# Patient Record
Sex: MALE | Race: WHITE | NOT HISPANIC OR LATINO
[De-identification: names, ages, dates, MRNs, and addresses within clinical notes are randomized per-mention and may not be internally consistent; named-entity substitution may affect disease eponyms.]

---

## 2018-04-16 PROBLEM — Z00.00 ENCOUNTER FOR PREVENTIVE HEALTH EXAMINATION: Status: ACTIVE | Noted: 2018-04-16

## 2018-05-24 ENCOUNTER — APPOINTMENT (OUTPATIENT)
Dept: ELECTROPHYSIOLOGY | Facility: CLINIC | Age: 63
End: 2018-05-24
Payer: MEDICARE

## 2018-05-24 VITALS
SYSTOLIC BLOOD PRESSURE: 144 MMHG | HEART RATE: 63 BPM | WEIGHT: 149 LBS | DIASTOLIC BLOOD PRESSURE: 64 MMHG | BODY MASS INDEX: 23.39 KG/M2 | HEIGHT: 67 IN | OXYGEN SATURATION: 100 %

## 2018-05-24 DIAGNOSIS — F79 UNSPECIFIED INTELLECTUAL DISABILITIES: ICD-10-CM

## 2018-05-24 DIAGNOSIS — Z86.39 PERSONAL HISTORY OF OTHER ENDOCRINE, NUTRITIONAL AND METABOLIC DISEASE: ICD-10-CM

## 2018-05-24 DIAGNOSIS — I42.9 CARDIOMYOPATHY, UNSPECIFIED: ICD-10-CM

## 2018-05-24 DIAGNOSIS — R56.9 UNSPECIFIED CONVULSIONS: ICD-10-CM

## 2018-05-24 PROCEDURE — 93000 ELECTROCARDIOGRAM COMPLETE: CPT

## 2018-05-24 PROCEDURE — 99205 OFFICE O/P NEW HI 60 MIN: CPT

## 2018-05-24 RX ORDER — OLOPATADINE HCL 1 MG/ML
0.1 SOLUTION/ DROPS OPHTHALMIC
Qty: 5 | Refills: 0 | Status: DISCONTINUED | COMMUNITY
Start: 2018-02-22

## 2018-05-24 RX ORDER — OLOPATADINE HYDROCHLORIDE 2 MG/ML
0.2 SOLUTION OPHTHALMIC
Qty: 2 | Refills: 0 | Status: DISCONTINUED | COMMUNITY
Start: 2018-01-31

## 2018-05-24 RX ORDER — PHENYTOIN 125 MG/5ML
125 SUSPENSION ORAL 3 TIMES DAILY
Refills: 0 | Status: ACTIVE | COMMUNITY
Start: 2017-12-06

## 2018-05-24 RX ORDER — MELOXICAM 15 MG/1
15 TABLET ORAL
Refills: 0 | Status: ACTIVE | COMMUNITY
Start: 2017-12-24

## 2018-06-06 ENCOUNTER — OUTPATIENT (OUTPATIENT)
Dept: OUTPATIENT SERVICES | Facility: HOSPITAL | Age: 63
LOS: 1 days | End: 2018-06-06
Payer: MEDICARE

## 2018-06-06 VITALS
HEIGHT: 67 IN | TEMPERATURE: 98 F | DIASTOLIC BLOOD PRESSURE: 60 MMHG | HEART RATE: 65 BPM | RESPIRATION RATE: 18 BRPM | SYSTOLIC BLOOD PRESSURE: 123 MMHG | OXYGEN SATURATION: 99 % | WEIGHT: 149.03 LBS

## 2018-06-06 VITALS
DIASTOLIC BLOOD PRESSURE: 60 MMHG | TEMPERATURE: 98 F | WEIGHT: 149.03 LBS | SYSTOLIC BLOOD PRESSURE: 123 MMHG | HEIGHT: 67 IN | RESPIRATION RATE: 18 BRPM | OXYGEN SATURATION: 99 % | HEART RATE: 64 BPM

## 2018-06-06 DIAGNOSIS — Z90.89 ACQUIRED ABSENCE OF OTHER ORGANS: Chronic | ICD-10-CM

## 2018-06-06 DIAGNOSIS — Z01.810 ENCOUNTER FOR PREPROCEDURAL CARDIOVASCULAR EXAMINATION: ICD-10-CM

## 2018-06-06 DIAGNOSIS — Z98.890 OTHER SPECIFIED POSTPROCEDURAL STATES: Chronic | ICD-10-CM

## 2018-06-06 DIAGNOSIS — H26.9 UNSPECIFIED CATARACT: Chronic | ICD-10-CM

## 2018-06-06 LAB
ANION GAP SERPL CALC-SCNC: 11 MMOL/L — SIGNIFICANT CHANGE UP (ref 5–17)
APTT BLD: 33.9 SEC — SIGNIFICANT CHANGE UP (ref 27.5–37.4)
BLD GP AB SCN SERPL QL: SIGNIFICANT CHANGE UP
BLD GP AB SCN SERPL QL: SIGNIFICANT CHANGE UP
BUN SERPL-MCNC: 14 MG/DL — SIGNIFICANT CHANGE UP (ref 8–20)
CALCIUM SERPL-MCNC: 8.9 MG/DL — SIGNIFICANT CHANGE UP (ref 8.6–10.2)
CHLORIDE SERPL-SCNC: 99 MMOL/L — SIGNIFICANT CHANGE UP (ref 98–107)
CO2 SERPL-SCNC: 28 MMOL/L — SIGNIFICANT CHANGE UP (ref 22–29)
CREAT SERPL-MCNC: 0.62 MG/DL — SIGNIFICANT CHANGE UP (ref 0.5–1.3)
GLUCOSE SERPL-MCNC: 86 MG/DL — SIGNIFICANT CHANGE UP (ref 70–115)
HCT VFR BLD CALC: 38.7 % — LOW (ref 42–52)
HGB BLD-MCNC: 12.7 G/DL — LOW (ref 14–18)
INR BLD: 1.04 RATIO — SIGNIFICANT CHANGE UP (ref 0.88–1.16)
MAGNESIUM SERPL-MCNC: 2.1 MG/DL — SIGNIFICANT CHANGE UP (ref 1.6–2.6)
MCHC RBC-ENTMCNC: 30.8 PG — SIGNIFICANT CHANGE UP (ref 27–31)
MCHC RBC-ENTMCNC: 32.8 G/DL — SIGNIFICANT CHANGE UP (ref 32–36)
MCV RBC AUTO: 93.9 FL — SIGNIFICANT CHANGE UP (ref 80–94)
PLATELET # BLD AUTO: 159 K/UL — SIGNIFICANT CHANGE UP (ref 150–400)
POTASSIUM SERPL-MCNC: 4.4 MMOL/L — SIGNIFICANT CHANGE UP (ref 3.5–5.3)
POTASSIUM SERPL-SCNC: 4.4 MMOL/L — SIGNIFICANT CHANGE UP (ref 3.5–5.3)
PROTHROM AB SERPL-ACNC: 11.4 SEC — SIGNIFICANT CHANGE UP (ref 9.8–12.7)
RBC # BLD: 4.12 M/UL — LOW (ref 4.6–6.2)
RBC # FLD: 14.6 % — SIGNIFICANT CHANGE UP (ref 11–15.6)
SODIUM SERPL-SCNC: 138 MMOL/L — SIGNIFICANT CHANGE UP (ref 135–145)
WBC # BLD: 4 K/UL — LOW (ref 4.8–10.8)
WBC # FLD AUTO: 4 K/UL — LOW (ref 4.8–10.8)

## 2018-06-06 PROCEDURE — 85730 THROMBOPLASTIN TIME PARTIAL: CPT

## 2018-06-06 PROCEDURE — 86850 RBC ANTIBODY SCREEN: CPT

## 2018-06-06 PROCEDURE — 85610 PROTHROMBIN TIME: CPT

## 2018-06-06 PROCEDURE — 83735 ASSAY OF MAGNESIUM: CPT

## 2018-06-06 PROCEDURE — 93005 ELECTROCARDIOGRAM TRACING: CPT

## 2018-06-06 PROCEDURE — 85027 COMPLETE CBC AUTOMATED: CPT

## 2018-06-06 PROCEDURE — 86900 BLOOD TYPING SEROLOGIC ABO: CPT

## 2018-06-06 PROCEDURE — 36415 COLL VENOUS BLD VENIPUNCTURE: CPT

## 2018-06-06 PROCEDURE — 93010 ELECTROCARDIOGRAM REPORT: CPT

## 2018-06-06 PROCEDURE — 86901 BLOOD TYPING SEROLOGIC RH(D): CPT

## 2018-06-06 PROCEDURE — 80048 BASIC METABOLIC PNL TOTAL CA: CPT

## 2018-06-06 PROCEDURE — G0463: CPT

## 2018-06-06 NOTE — H&P PST ADULT - HISTORY OF PRESENT ILLNESS
TTE 11/9/17: EF 53%, inferoseptal WMA, mild-mod MR, mild AI  Stress test 10/22/15: no ischemia 62 year old gentleman with history of mental retardation HTN, bipolar disease, right cervical carotid pseudoaneurysm, stroke and history of atrial futter, cardiomyopathy with recovered LV function. Given his documented history of atrial flutter, there is an appropriate concern for a thromboembolic etiology of his stroke, and therefore he warrants consideration of anticoagulation (CHADSVASc score =3). However, given the report of atrial flutter only documented in the setting of sepsis, there is some question whether he continues to have paroxysmal atrial arrhythmias warranting prophylaixs. If so, however, due to his cognitive disability and frequent falls, he is not a good candidate for long-term anticoagulation. We therefore discussed the potential for percutaneous left atrial appendage occlusion (eg the Watchman procedure) as an alternative to long-term anticoagulation if indicated. The procedure was discussed in detail, including risks and benefits, and procedure related risks of bleeding, cardiac perforation, and stroke. After a shared decision making process with the pt to the best of his ability, his medical assistant, and his cardiologist Dr Frazier, the decision was made to proceed with Watchman procedure if recurrent atrial arrhythmias are noted. A loop recorder implant would be the most effective way to monitor for arrhythmia recurrence, and if so, will then proceed with Watchman procedure.   -Will plan HEIDI for assessment of LAURE anatomy and r/o present thrombus    -Will plan ILR implant for long-term monitoring for arrhythmia recurrence  -following above if atrial flutter or atrial fibrillation documented, will plan Watchman procedure. He will need to start short term anticoagulation (likely safest with NOAC) after the procedure for at least 6 weeks. After this a HEIDI will be done to assess the device, and if well seated will stop anticoagulation. He will then take ASA and Plavix until 6 mo post procedure, and then should go back to ASA alone indefinitely.       -The patient’s mother is his health care proxy and signs consents. We will include her in the shared decision making process as well.  TTE 11/9/17: EF 53%, inferoseptal WMA, mild-mod MR, mild AI  Stress test 10/22/15: no ischemia 62 year old gentleman with history of mental retardation, HTN, bipolar disease, right cervical carotid pseudoaneurysm, stroke and history of atrial futter, PVCs s/p prior ablation and cardiomyopathy with recovered LV function. Given his documented history of atrial flutter, there is an appropriate concern for a thromboembolic etiology of his stroke, and therefore he warrants consideration of anticoagulation (CHADSVASc score =3). However, given the report of atrial flutter only documented in the setting of sepsis, there is some question whether he continues to have paroxysmal atrial arrhythmias warranting prophylaixs. If so, however, due to his cognitive disability and frequent falls, he is not a good candidate for long-term anticoagulation. We therefore discussed the potential for percutaneous left atrial appendage occlusion (eg the Watchman procedure) as an alternative to long-term anticoagulation if indicated. The procedure was discussed in detail, including risks and benefits, and procedure related risks of bleeding, cardiac perforation, and stroke. After a shared decision making process with the pt to the best of his ability, his medical assistant, and his cardiologist Dr Frazier, the decision was made to proceed with Watchman procedure if recurrent atrial arrhythmias are noted. A loop recorder implant would be the most effective way to monitor for arrhythmia recurrence, and if so, will then proceed with Watchman procedure.   -Will plan HEIDI for assessment of LAURE anatomy and r/o present thrombus    -Will plan ILR implant for long-term monitoring for arrhythmia recurrence  -following above if atrial flutter or atrial fibrillation documented, will plan Watchman procedure. He will need to start short term anticoagulation (likely safest with NOAC) after the procedure for at least 6 weeks. After this a HEIDI will be done to assess the device, and if well seated will stop anticoagulation. He will then take ASA and Plavix until 6 mo post procedure, and then should go back to ASA alone indefinitely.       -The patient’s mother is his health care proxy and signs consents. We will include her in the shared decision making process as well.  TTE 11/9/17: EF 53%, inferoseptal WMA, mild-mod MR, mild AI  Stress test 10/22/15: no ischemia 62 year old gentleman with history of mental retardation, HTN, bipolar disease, seizure d/o (last seizure >10 years ago, right cervical carotid pseudoaneurysm, stroke and history of atrial futter, PVCs s/p prior ablation and cardiomyopathy with recovered LV function. Given his documented history of atrial flutter, there is an appropriate concern for a thromboembolic etiology of his stroke, and therefore he warrants consideration of anticoagulation (CHADSVASc score =3). However, given the report of atrial flutter only documented in the setting of sepsis, there is some question whether he continues to have paroxysmal atrial arrhythmias warranting prophylaixs. If so, however, due to his cognitive disability and frequent falls, he is not a good candidate for long-term anticoagulation. We therefore discussed the potential for percutaneous left atrial appendage occlusion (eg the Watchman procedure) as an alternative to long-term anticoagulation if indicated. The procedure was discussed in detail, including risks and benefits, and procedure related risks of bleeding, cardiac perforation, and stroke. After a shared decision making process with the pt to the best of his ability, his medical assistant, and his cardiologist Dr Frazier, the decision was made to proceed with Watchman procedure if recurrent atrial arrhythmias are noted. A loop recorder implant would be the most effective way to monitor for arrhythmia recurrence, and if so, will then proceed with Watchman procedure.   -Will plan HEIDI for assessment of LAURE anatomy and r/o present thrombus    -Will plan ILR implant for long-term monitoring for arrhythmia recurrence  -following above if atrial flutter or atrial fibrillation documented, will plan Watchman procedure. He will need to start short term anticoagulation (likely safest with NOAC) after the procedure for at least 6 weeks. After this a HEIDI will be done to assess the device, and if well seated will stop anticoagulation. He will then take ASA and Plavix until 6 mo post procedure, and then should go back to ASA alone indefinitely.       -The patient’s mother is his health care proxy and signs consents. We will include her in the shared decision making process as well.  TTE 11/9/17: EF 53%, inferoseptal WMA, mild-mod MR, mild AI  Stress test 10/22/15: no ischemia

## 2018-06-06 NOTE — ASU PATIENT PROFILE, ADULT - PMH
Atrial flutter  in setting of sepsis  Bipolar disorder    Cardiomyopathy  recovered LV function  CVA (cerebral vascular accident)    HTN (hypertension)    Hypothyroid    Mental retardation    Pseudoaneurysm  right cerviacl carotid  PVC (premature ventricular contraction)  s/p pvc ablation by Dr Barrientos 2/2016  Seizure disorder

## 2018-06-06 NOTE — ASU PATIENT PROFILE, ADULT - PSH
Bilateral cataracts  s/p removal  H/O cardiac radiofrequency ablation  PVC-Dr Barrientos 2/2016  H/O tracheostomy  successful removal  S/P tonsillectomy

## 2018-06-06 NOTE — H&P PST ADULT - PMH
Atrial flutter    Bipolar disorder    Cardiomyopathy  recovered LV function  CVA (cerebral vascular accident)    HTN (hypertension)    Mental retardation    Pseudoaneurysm  right cerviacl carotid  PVC (premature ventricular contraction)  s/p pvc ablation by Dr Barrientos 2/2016 Atrial flutter  in setting of sepsis  Bipolar disorder    Cardiomyopathy  recovered LV function  CVA (cerebral vascular accident)    HTN (hypertension)    Hypothyroid    Mental retardation    Pseudoaneurysm  right cerviacl carotid  PVC (premature ventricular contraction)  s/p pvc ablation by Dr Barrientos 2/2016 Atrial flutter  in setting of sepsis  Bipolar disorder    Cardiomyopathy  recovered LV function  CVA (cerebral vascular accident)    HTN (hypertension)    Hypothyroid    Mental retardation    Pseudoaneurysm  right cerviacl carotid  PVC (premature ventricular contraction)  s/p pvc ablation by Dr Barrientos 2/2016  Seizure disorder

## 2018-06-06 NOTE — H&P PST ADULT - NSANTHOSAYNRD_GEN_A_CORE
No. KERRI screening performed.  STOP BANG Legend: 0-2 = LOW Risk; 3-4 = INTERMEDIATE Risk; 5-8 = HIGH Risk

## 2018-06-06 NOTE — H&P PST ADULT - TEMPERATURE IN FAHRENHEIT (DEGREES F)
- has fever, leukocytosis, recent hospital stay  - fever could be 2/2 lymphoma however will cover with broad spectrum abx for now.  - f/u blood cultures, urine cultures (NGTD)  - c/w broad spectrum Abx (vancomycin/Zosyn) 97.8 - has fever, leukocytosis, recent hospital stay  - fever could be 2/2 lymphoma however will cover with broad spectrum abx for now.  - f/u blood cultures, urine cultures (NGTD)  - c/w broad spectrum Abx (vancomycin/Cefepime)

## 2018-06-06 NOTE — H&P PST ADULT - PSH
H/O cardiac radiofrequency ablation  PVC-Dr Barrientos 2/2016 Bilateral cataracts  s/p removal  H/O cardiac radiofrequency ablation  PVC-Dr Barrientos 2/2016  H/O tracheostomy    S/P tonsillectomy Bilateral cataracts  s/p removal  H/O cardiac radiofrequency ablation  PVC-Dr Barrientos 2/2016  H/O tracheostomy  successful removal  S/P tonsillectomy

## 2018-06-06 NOTE — H&P PST ADULT - SOURCE OF INFORMATION, PROFILE
patient/chart(s) patient/Mother is HCP and signs pts consents/family/chart(s) Ngoc Quiros- Medical case worker. Mother is HCP and signs pts consents./patient/family/chart(s)

## 2018-06-06 NOTE — ASU PATIENT PROFILE, ADULT - FALL HARM RISK
seizure  meds,  and disorder,  grp home  has  adaptive equipment,  staff manages  safety needs of  client/other

## 2018-06-06 NOTE — H&P PST ADULT - ASSESSMENT
62 year old gentleman with history of mental retardation, HTN, bipolar disease, right cervical carotid pseudoaneurysm, stroke and history of atrial futter, PVCs s/p prior ablation and cardiomyopathy with recovered LV function. Given his documented history of atrial flutter, there is an appropriate concern for a thromboembolic etiology of his stroke, and therefore he warrants consideration of anticoagulation (CHADSVASc score =3). However, given the report of atrial flutter only documented in the setting of sepsis, there is some question whether he continues to have paroxysmal atrial arrhythmias warranting prophylaixs. If so, however, due to his cognitive disability and frequent falls, he is not a good candidate for long-term anticoagulation. We therefore discussed the potential for percutaneous left atrial appendage occlusion (eg the Watchman procedure) as an alternative to long-term anticoagulation if indicated. The procedure was discussed in detail, including risks and benefits, and procedure related risks of bleeding, cardiac perforation, and stroke. After a shared decision making process with the pt to the best of his ability, his medical assistant, and his cardiologist Dr Frazier, the decision was made to proceed with Watchman procedure if recurrent atrial arrhythmias are noted. A loop recorder implant would be the most effective way to monitor for arrhythmia recurrence, and if so, will then proceed with Watchman procedure.   -Will plan HEIDI for assessment of LAURE anatomy and r/o present thrombus    -Will plan ILR implant for long-term monitoring for arrhythmia recurrence  -following above if atrial flutter or atrial fibrillation documented, will plan Watchman procedure. He will need to start short term anticoagulation (likely safest with NOAC) after the procedure for at least 6 weeks. After this a HEIDI will be done to assess the device, and if well seated will stop anticoagulation. He will then take ASA and Plavix until 6 mo post procedure, and then should go back to ASA alone indefinitely. 62 year old gentleman with history of mental retardation, HTN, bipolar disease, right cervical carotid pseudoaneurysm, stroke and history of atrial futter, PVCs s/p prior ablation and cardiomyopathy with recovered LV function. Given his documented history of atrial flutter, there is an appropriate concern for a thromboembolic etiology of his stroke, and therefore he warrants consideration of anticoagulation (CHADSVASc score =3). However, given the report of atrial flutter only documented in the setting of sepsis, there is some question whether he continues to have paroxysmal atrial arrhythmias warranting prophylaixs. If so, however, due to his cognitive disability and frequent falls, he is not a good candidate for long-term anticoagulation. We therefore discussed the potential for percutaneous left atrial appendage occlusion (eg the Watchman procedure) as an alternative to long-term anticoagulation if indicated. The procedure was discussed in detail, including risks and benefits, and procedure related risks of bleeding, cardiac perforation, and stroke. After a shared decision making process with the pt to the best of his ability, his medical assistant, and his cardiologist Dr Frazier, the decision was made to proceed with Watchman procedure if recurrent atrial arrhythmias are noted. A loop recorder implant would be the most effective way to monitor for arrhythmia recurrence, and if so, will then proceed with Watchman procedure.   -Will plan HEIDI for assessment of LAURE anatomy and r/o present thrombus    -Will plan ILR implant for long-term monitoring for arrhythmia recurrence  -following above if atrial flutter or atrial fibrillation documented, will plan Watchman procedure. He will need to start short term anticoagulation (likely safest with NOAC) after the procedure for at least 6 weeks. After this a HEIDI will be done to assess the device, and if well seated will stop anticoagulation. He will then take ASA and Plavix until 6 mo post procedure, and then should go back to ASA alone indefinitely.    -npo after midnight for procedure 62 year old gentleman with history of mental retardation, HTN, bipolar disease, seizure d/o (last seizure >10 years ago, right cervical carotid pseudoaneurysm, stroke and history of atrial futter, PVCs s/p prior ablation and cardiomyopathy with recovered LV function. Given his documented history of atrial flutter, there is an appropriate concern for a thromboembolic etiology of his stroke, and therefore he warrants consideration of anticoagulation (CHADSVASc score =3). However, given the report of atrial flutter only documented in the setting of sepsis, there is some question whether he continues to have paroxysmal atrial arrhythmias warranting prophylaixs. If so, however, due to his cognitive disability and frequent falls, he is not a good candidate for long-term anticoagulation. We therefore discussed the potential for percutaneous left atrial appendage occlusion (eg the Watchman procedure) as an alternative to long-term anticoagulation if indicated. The procedure was discussed in detail, including risks and benefits, and procedure related risks of bleeding, cardiac perforation, and stroke. After a shared decision making process with the pt to the best of his ability, his medical assistant, and his cardiologist Dr Frazier, the decision was made to proceed with Watchman procedure if recurrent atrial arrhythmias are noted. A loop recorder implant would be the most effective way to monitor for arrhythmia recurrence, and if so, will then proceed with Watchman procedure.   -Will plan HEIDI for assessment of LAURE anatomy and r/o present thrombus    -Will plan ILR implant for long-term monitoring for arrhythmia recurrence  -following above if atrial flutter or atrial fibrillation documented, will plan Watchman procedure. He will need to start short term anticoagulation (likely safest with NOAC) after the procedure for at least 6 weeks. After this a HEIDI will be done to assess the device, and if well seated will stop anticoagulation. He will then take ASA and Plavix until 6 mo post procedure, and then should go back to ASA alone indefinitely.    -npo after midnight for procedure

## 2018-06-07 ENCOUNTER — FORM ENCOUNTER (OUTPATIENT)
Age: 63
End: 2018-06-07

## 2018-06-07 LAB — TYPE + AB SCN PNL BLD: SIGNIFICANT CHANGE UP

## 2018-06-08 ENCOUNTER — TRANSCRIPTION ENCOUNTER (OUTPATIENT)
Age: 63
End: 2018-06-08

## 2018-06-08 ENCOUNTER — OUTPATIENT (OUTPATIENT)
Dept: OUTPATIENT SERVICES | Facility: HOSPITAL | Age: 63
LOS: 1 days | Discharge: HOME | End: 2018-06-08
Payer: MEDICARE

## 2018-06-08 DIAGNOSIS — Z90.89 ACQUIRED ABSENCE OF OTHER ORGANS: Chronic | ICD-10-CM

## 2018-06-08 DIAGNOSIS — Z98.890 OTHER SPECIFIED POSTPROCEDURAL STATES: Chronic | ICD-10-CM

## 2018-06-08 DIAGNOSIS — Z01.810 ENCOUNTER FOR PREPROCEDURAL CARDIOVASCULAR EXAMINATION: ICD-10-CM

## 2018-06-08 DIAGNOSIS — I48.91 UNSPECIFIED ATRIAL FIBRILLATION: ICD-10-CM

## 2018-06-08 DIAGNOSIS — R55 SYNCOPE AND COLLAPSE: ICD-10-CM

## 2018-06-08 DIAGNOSIS — H26.9 UNSPECIFIED CATARACT: Chronic | ICD-10-CM

## 2018-06-08 PROCEDURE — C1764: CPT

## 2018-06-08 PROCEDURE — 33282: CPT

## 2018-06-08 PROCEDURE — 93325 DOPPLER ECHO COLOR FLOW MAPG: CPT | Mod: 26

## 2018-06-08 PROCEDURE — 75574 CT ANGIO HRT W/3D IMAGE: CPT | Mod: 26

## 2018-06-08 PROCEDURE — 93325 DOPPLER ECHO COLOR FLOW MAPG: CPT

## 2018-06-08 PROCEDURE — 93312 ECHO TRANSESOPHAGEAL: CPT | Mod: 26

## 2018-06-08 PROCEDURE — 93320 DOPPLER ECHO COMPLETE: CPT

## 2018-06-08 PROCEDURE — 75574 CT ANGIO HRT W/3D IMAGE: CPT

## 2018-06-08 PROCEDURE — 93320 DOPPLER ECHO COMPLETE: CPT | Mod: 26

## 2018-06-08 PROCEDURE — 93312 ECHO TRANSESOPHAGEAL: CPT

## 2018-06-08 RX ORDER — CEFAZOLIN SODIUM 1 G
2000 VIAL (EA) INJECTION ONCE
Qty: 0 | Refills: 0 | Status: COMPLETED | OUTPATIENT
Start: 2018-06-08 | End: 2018-06-08

## 2018-06-08 RX ADMIN — Medication 100 MILLIGRAM(S): at 13:30

## 2018-06-08 NOTE — DISCHARGE NOTE ADULT - CARE PLAN
Principal Discharge DX:	Status post placement of implantable loop recorder  Goal:	rhythm monitoring  Assessment and plan of treatment:	Loop Recorder Incision Care:     - Remove the plastic and gauze dressing after 24 hours.   - Do not touch the incision until it is completely healed.   - There is Dermabond (skin glue) on your incision, which will start to flake off on its own over the next 2-3 weeks. Do not pick at or peal off the Dermabond.   - Do not apply soaps, creams, lotions, ointments or powders to the incision until it is completely healed.  - You should call the doctor if you notice redness, drainage, swelling, increased tenderness, hot sensation around the  incision, bleeding or incision edges pulling apart.

## 2018-06-08 NOTE — DISCHARGE NOTE ADULT - HOSPITAL COURSE
62 year old gentleman with history of mental retardation, HTN, bipolar disease, seizure d/o (last seizure >10 years ago, right cervical carotid pseudoaneurysm, stroke and history of atrial futter, PVCs s/p prior ablation and cardiomyopathy with recovered LV function. Given his documented history of atrial flutter, there is an appropriate concern for a thromboembolic etiology of his stroke, and therefore he warrants consideration of anticoagulation (CHADSVASc score =3). However, given the report of atrial flutter only documented in the setting of sepsis, there is some question whether he continues to have paroxysmal atrial arrhythmias warranting prophylaixs. If so, however, due to his cognitive disability and frequent falls, he is not a good candidate for long-term anticoagulation. We therefore discussed the potential for percutaneous left atrial appendage occlusion (eg the Watchman procedure) as an alternative to long-term anticoagulation if indicated. The procedure was discussed in detail, including risks and benefits, and procedure related risks of bleeding, cardiac perforation, and stroke. After a shared decision making process with the pt to the best of his ability, his medical assistant, and his cardiologist Dr Frazier, the decision was made to proceed with Watchman procedure if recurrent atrial arrhythmias are noted. A loop recorder implant would be the most effective way to monitor for arrhythmia recurrence, and if so, will then proceed with Watchman procedure.   -Will plan HEIDI for assessment of LAURE anatomy and r/o present thrombus    -Will plan ILR implant for long-term monitoring for arrhythmia recurrence  -following above if atrial flutter or atrial fibrillation documented, will plan Watchman procedure. He will need to start short term anticoagulation (likely safest with NOAC) after the procedure for at least 6 weeks. After this a HEIDI will be done to assess the device, and if well seated will stop anticoagulation. He will then take ASA and Plavix until 6 mo post procedure, and then should go back to ASA alone indefinitely.    6/8/18: pt presented electively for HEIDI/ILR as above, HEIDI probe unable to pass most likely secondary to prior tracheostomy. ILR to left parasterum without incident.  CT scan to eval LAURE for WATCHMAN 62 year old gentleman with history of mental retardation, HTN, bipolar disease, seizure d/o (last seizure >10 years ago, right cervical carotid pseudoaneurysm, stroke and history of atrial futter, PVCs s/p prior ablation and cardiomyopathy with recovered LV function. Given his documented history of atrial flutter, there is an appropriate concern for a thromboembolic etiology of his stroke, and therefore he warrants consideration of anticoagulation (CHADSVASc score =3). However, given the report of atrial flutter only documented in the setting of sepsis, there is some question whether he continues to have paroxysmal atrial arrhythmias warranting prophylaixs. If so, however, due to his cognitive disability and frequent falls, he is not a good candidate for long-term anticoagulation. We therefore discussed the potential for percutaneous left atrial appendage occlusion (eg the Watchman procedure) as an alternative to long-term anticoagulation if indicated. The procedure was discussed in detail, including risks and benefits, and procedure related risks of bleeding, cardiac perforation, and stroke. After a shared decision making process with the pt to the best of his ability, his medical assistant, and his cardiologist Dr Frazier, the decision was made to proceed with Watchman procedure if recurrent atrial arrhythmias are noted. A loop recorder implant would be the most effective way to monitor for arrhythmia recurrence, and if so, will then proceed with Watchman procedure.   -Will plan HEIDI for assessment of LAURE anatomy and r/o present thrombus    -Will plan ILR implant for long-term monitoring for arrhythmia recurrence  -following above if atrial flutter or atrial fibrillation documented, will plan Watchman procedure. He will need to start short term anticoagulation (likely safest with NOAC) after the procedure for at least 6 weeks. After this a HEIDI will be done to assess the device, and if well seated will stop anticoagulation. He will then take ASA and Plavix until 6 mo post procedure, and then should go back to ASA alone indefinitely.    6/8/18: pt presented electively for HEIDI/ILR as above, HEIDI probe unable to pass most likely secondary to prior tracheostomy. ILR to left parasterum without incident.  CT scan to eval LAURE for WATCHMAN.  Pt stable to return to [facility].  device care and follow up reviewed with pt and caregiver- Miguel Ángel Quiros-medical case manager from [facility]

## 2018-06-08 NOTE — DISCHARGE NOTE ADULT - CARE PROVIDER_API CALL
Francisco Ivy), Cardiology; Internal Medicine  [address] [place] [address]  Phone: [phone]  Fax: [phone]

## 2018-06-08 NOTE — DISCHARGE NOTE ADULT - PATIENT PORTAL LINK FT
You can access the [facility] Portal, offered by [facility], by registering with the following website: [email or web address]

## 2018-06-08 NOTE — PROGRESS NOTE ADULT - SUBJECTIVE AND OBJECTIVE BOX
Unsuccessful HEIDI, probe unable to pass most likely secondary to prior trach.  ILR to left parasternum c/d/i +dermabond, no bleeding, swelling, hematoma  VSS    -will perform CT scan with IV contrast to eval if LAURE anatomy amenable for WATCHMAN implant.    above discussed with ptMiguel Ángel  from group home, pts mother Jayna @ [phone] and Dr Ivy, agrees.

## 2018-06-08 NOTE — PROGRESS NOTE ADULT - SUBJECTIVE AND OBJECTIVE BOX
Pt returned from CT scan  Feels well  Eating lunch  stable for dc home  outpt f/u and device care reviewed with pt and Miguel Ángel Luna-Medical case manager from [facility]

## 2018-06-08 NOTE — DISCHARGE NOTE ADULT - MEDICATION SUMMARY - MEDICATIONS TO TAKE
I will START or STAY ON the medications listed below when I get home from the hospital:    refresh  0.5%  drops  -- 1 drop(s) in each eye 4 times a day, As Needed   -- Indication: For eye drop    aspirin 81 mg oral tablet  -- 1 tab(s) by mouth once a day  -- Indication: For Atrial fibrillation    meloxicam 15 mg oral tablet  -- 1 tab(s) by mouth once a day  -- Indication: For NSAId    Dilantin-125 oral suspension  -- 5 milliliter(s) by mouth 2 times a day  -- Indication: For seizure    PHENobarbital 32.4 mg oral tablet  -- 2 tab(s) by mouth 2 times a day  -- Indication: For seizure    Depakene 250 mg/5 mL oral syrup  -- 5 milliliter(s) by mouth 3 times a day  -- Indication: For seizure    sertraline 100 mg oral tablet  -- 1.5 tab(s) by mouth once a day  -- Indication: For Anti depressant     loratadine 10 mg oral tablet  -- 1 tab(s) by mouth once a day  -- Indication: For Allergy    Metoprolol Succinate ER 25 mg oral tablet, extended release  -- 1 tab(s) by mouth once a day  -- Indication: For htn    olopatadine 0.1% ophthalmic solution  -- 1 drop(s) to each affected eye 2 times a day, As Needed  -- Indication: For eye drop    esomeprazole 40 mg oral delayed release capsule  -- 1 cap(s) by mouth once a day  -- Indication: For GERD    levothyroxine 112 mcg (0.112 mg) oral tablet  -- 1 tab(s) by mouth once a day  -- Indication: For hypothyroid    Multiple Vitamins oral tablet  -- 1 tab(s) by mouth once a day  -- Indication: For vitamin    Vitamin C 500 mg oral tablet  -- 1 tab(s) by mouth once a day  -- Indication: For vitmain

## 2018-06-08 NOTE — DISCHARGE NOTE ADULT - PLAN OF CARE
rhythm monitoring Loop Recorder Incision Care:     - Remove the plastic and gauze dressing after 24 hours.   - Do not touch the incision until it is completely healed.   - There is Dermabond (skin glue) on your incision, which will start to flake off on its own over the next 2-3 weeks. Do not pick at or peal off the Dermabond.   - Do not apply soaps, creams, lotions, ointments or powders to the incision until it is completely healed.  - You should call the doctor if you notice redness, drainage, swelling, increased tenderness, hot sensation around the  incision, bleeding or incision edges pulling apart.

## 2018-06-20 ENCOUNTER — APPOINTMENT (OUTPATIENT)
Dept: ELECTROPHYSIOLOGY | Facility: CLINIC | Age: 63
End: 2018-06-20
Payer: MEDICARE

## 2018-06-20 VITALS
SYSTOLIC BLOOD PRESSURE: 143 MMHG | HEIGHT: 67 IN | BODY MASS INDEX: 23.56 KG/M2 | HEART RATE: 56 BPM | DIASTOLIC BLOOD PRESSURE: 73 MMHG | WEIGHT: 150.13 LBS | OXYGEN SATURATION: 98 %

## 2018-06-20 DIAGNOSIS — I49.9 CARDIAC ARRHYTHMIA, UNSPECIFIED: ICD-10-CM

## 2018-06-20 PROCEDURE — 99024 POSTOP FOLLOW-UP VISIT: CPT

## 2018-07-13 ENCOUNTER — APPOINTMENT (OUTPATIENT)
Dept: ELECTROPHYSIOLOGY | Facility: CLINIC | Age: 63
End: 2018-07-13
Payer: MEDICARE

## 2018-07-13 PROCEDURE — 93299: CPT

## 2018-07-13 PROCEDURE — 93298 REM INTERROG DEV EVAL SCRMS: CPT

## 2018-07-17 PROBLEM — I48.92 UNSPECIFIED ATRIAL FLUTTER: Chronic | Status: ACTIVE | Noted: 2018-06-06

## 2018-08-13 ENCOUNTER — APPOINTMENT (OUTPATIENT)
Dept: ELECTROPHYSIOLOGY | Facility: CLINIC | Age: 63
End: 2018-08-13
Payer: MEDICARE

## 2018-08-13 PROCEDURE — 93298 REM INTERROG DEV EVAL SCRMS: CPT

## 2018-08-13 PROCEDURE — 93299: CPT

## 2018-09-14 ENCOUNTER — APPOINTMENT (OUTPATIENT)
Dept: ELECTROPHYSIOLOGY | Facility: CLINIC | Age: 63
End: 2018-09-14
Payer: MEDICARE

## 2018-09-14 PROCEDURE — 93299: CPT

## 2018-09-14 PROCEDURE — 93298 REM INTERROG DEV EVAL SCRMS: CPT

## 2018-10-15 ENCOUNTER — APPOINTMENT (OUTPATIENT)
Dept: ELECTROPHYSIOLOGY | Facility: CLINIC | Age: 63
End: 2018-10-15
Payer: MEDICARE

## 2018-10-15 PROCEDURE — 93298 REM INTERROG DEV EVAL SCRMS: CPT

## 2018-10-15 PROCEDURE — 93299: CPT

## 2018-11-16 ENCOUNTER — APPOINTMENT (OUTPATIENT)
Dept: ELECTROPHYSIOLOGY | Facility: CLINIC | Age: 63
End: 2018-11-16
Payer: MEDICARE

## 2018-11-16 PROCEDURE — 93299: CPT

## 2018-11-16 PROCEDURE — 93298 REM INTERROG DEV EVAL SCRMS: CPT

## 2018-12-17 ENCOUNTER — APPOINTMENT (OUTPATIENT)
Dept: ELECTROPHYSIOLOGY | Facility: CLINIC | Age: 63
End: 2018-12-17
Payer: MEDICARE

## 2018-12-17 PROCEDURE — 93298 REM INTERROG DEV EVAL SCRMS: CPT

## 2018-12-17 PROCEDURE — 93299: CPT

## 2018-12-20 ENCOUNTER — NON-APPOINTMENT (OUTPATIENT)
Age: 63
End: 2018-12-20

## 2018-12-20 ENCOUNTER — APPOINTMENT (OUTPATIENT)
Dept: ELECTROPHYSIOLOGY | Facility: CLINIC | Age: 63
End: 2018-12-20
Payer: MEDICARE

## 2018-12-20 VITALS
HEIGHT: 67 IN | BODY MASS INDEX: 23.23 KG/M2 | OXYGEN SATURATION: 99 % | HEART RATE: 62 BPM | DIASTOLIC BLOOD PRESSURE: 74 MMHG | SYSTOLIC BLOOD PRESSURE: 136 MMHG | WEIGHT: 148 LBS

## 2018-12-20 DIAGNOSIS — I48.91 UNSPECIFIED ATRIAL FIBRILLATION: ICD-10-CM

## 2018-12-20 PROCEDURE — 93000 ELECTROCARDIOGRAM COMPLETE: CPT

## 2018-12-20 PROCEDURE — 99215 OFFICE O/P EST HI 40 MIN: CPT | Mod: 25

## 2018-12-20 NOTE — PHYSICAL EXAM
[General Appearance - Well Developed] : well developed [Normal Appearance] : normal appearance [Well Groomed] : well groomed [General Appearance - Well Nourished] : well nourished [No Deformities] : no deformities [General Appearance - In No Acute Distress] : no acute distress [Respiration, Rhythm And Depth] : normal respiratory rhythm and effort [Auscultation Breath Sounds / Voice Sounds] : lungs were clear to auscultation bilaterally [Heart Rate And Rhythm] : heart rate and rhythm were normal [Heart Sounds] : normal S1 and S2 [Arterial Pulses Normal] : the arterial pulses were normal [Edema] : no peripheral edema present [Abnormal Walk] : normal gait [Skin Color & Pigmentation] : normal skin color and pigmentation [] : no rash [No Venous Stasis] : no venous stasis [Skin Lesions] : no skin lesions [No Skin Ulcers] : no skin ulcer [No Xanthoma] : no  xanthoma was observed [FreeTextEntry1] : confused

## 2018-12-20 NOTE — DISCUSSION/SUMMARY
[FreeTextEntry1] : 63 year old gentleman with history of mental retardation HTN, bipolar disease, right cervical carotid pseudoaneurysm, stroke and history of atrial flutter, cardiomyopathy with recovered LV function. ILR implanted 6/8/2018 for long term arrhythmia monitoring.  Interrogation today notable for 26 minute episode of PAF.  There is concern for a thromboembolic etiology of his stroke, and therefore he warrants consideration of anticoagulation (CHADSVASc score =3). Due to his cognitive disability and frequent falls, he is not a good candidate for long-term anticoagulation. \par -Previous HEIDI unsuccessful due to hx of tracheostomy and inability to pass probe. Advise for ENT evaluation prior to Watchman procedure.\par -Will plan for CTA heart to evaluate anatomy prior to watchman \par -He will need to start short term anticoagulation (likely safest with NOAC) after the procedure for at least 6 weeks. After this a HEIDI will be done to assess the device, and if well seated will stop anticoagulation. He will then take ASA and Plavix until 6 months post procedure, and then should go back to ASA alone indefinitely. \par \par The above plan was previously discussed with the patient's mother (health care proxy) now with documented AF episode will include her in shared decision making of above. \par Ermelinda Yates ANP-C

## 2018-12-20 NOTE — REVIEW OF SYSTEMS
[Confusion] : confusion was observed [Negative] : Heme/Lymph [Fever] : no fever [Headache] : no headache [Chills] : no chills [Feeling Fatigued] : not feeling fatigued [Blurry Vision] : no blurred vision [Seeing Double (Diplopia)] : no diplopia [Shortness Of Breath] : no shortness of breath [Chest  Pressure] : no chest pressure [Chest Pain] : no chest pain [Lower Ext Edema] : no extremity edema [Palpitations] : no palpitations [Cough] : no cough [Wheezing] : no wheezing [Coughing Up Blood] : no hemoptysis [Anxiety] : no anxiety

## 2018-12-20 NOTE — ADDENDUM
[FreeTextEntry1] : I was present for the above evaluation and agree with the history, physical, assessment and plan as above. S/p ILR now with recurrent paroxsymal AF noted. Given elevated risks of long-term anticoagulation, reasonable at this time to proceed with Watchman implant as previously discussed. He was unable to get HEIDI in past, and recently had ENT evaluation. Will need to clarify with ENT regarding feasibility of HEIDI intraprocedurally, and on followup, for successful Watchman implantation. \par I again reviewed the risks and benefits of Watchman implant with the patient and his caregiver, and all agreeable to proceed.

## 2018-12-20 NOTE — HISTORY OF PRESENT ILLNESS
[FreeTextEntry1] : Old note: 63 year old gentleman with history of mental retardation, HTN, bipolar disease, right cervical carotid pseudoaneurysm, prior stroke and atrial flutter, cardiomyopathy with recovered LV function. He lives in a group home and is accompanied by a medical liason. He has a history of documented atrial flutter but does not recall details of his arrhythmia history. Per Dr Moreno, the flutter was noted during sepsis, at which time he had a component of tachycardia related cardiomyopathy.  Perviously evaluated for watchman procedure due to increased concern for thromboembolic etiology of his stroke and concern due to cognitive disability and frequent falls, he is not a good candidate for long-term anticoagulation. \par \par Here today for ILR follow up.  On interrogation AT/AF episode 12/17/18 EGM c/w PAF 26 min duration average ventricular rate 102bpm.

## 2018-12-21 PROBLEM — I63.9 CEREBRAL INFARCTION, UNSPECIFIED: Chronic | Status: ACTIVE | Noted: 2018-06-06

## 2018-12-21 PROBLEM — I10 ESSENTIAL (PRIMARY) HYPERTENSION: Chronic | Status: ACTIVE | Noted: 2018-06-06

## 2018-12-21 PROBLEM — F79 UNSPECIFIED INTELLECTUAL DISABILITIES: Chronic | Status: ACTIVE | Noted: 2018-06-06

## 2018-12-21 PROBLEM — I72.9 ANEURYSM OF UNSPECIFIED SITE: Chronic | Status: ACTIVE | Noted: 2018-06-06

## 2018-12-21 PROBLEM — I49.3 VENTRICULAR PREMATURE DEPOLARIZATION: Chronic | Status: ACTIVE | Noted: 2018-06-06

## 2018-12-21 PROBLEM — I42.9 CARDIOMYOPATHY, UNSPECIFIED: Chronic | Status: ACTIVE | Noted: 2018-06-06

## 2018-12-21 PROBLEM — F31.9 BIPOLAR DISORDER, UNSPECIFIED: Chronic | Status: ACTIVE | Noted: 2018-06-06

## 2018-12-21 PROBLEM — G40.909 EPILEPSY, UNSPECIFIED, NOT INTRACTABLE, WITHOUT STATUS EPILEPTICUS: Chronic | Status: ACTIVE | Noted: 2018-06-06

## 2018-12-21 PROBLEM — E03.9 HYPOTHYROIDISM, UNSPECIFIED: Chronic | Status: ACTIVE | Noted: 2018-06-06

## 2019-01-08 ENCOUNTER — OUTPATIENT (OUTPATIENT)
Dept: OUTPATIENT SERVICES | Facility: HOSPITAL | Age: 64
LOS: 1 days | End: 2019-01-08
Payer: MEDICARE

## 2019-01-08 DIAGNOSIS — Z98.890 OTHER SPECIFIED POSTPROCEDURAL STATES: Chronic | ICD-10-CM

## 2019-01-08 DIAGNOSIS — H26.9 UNSPECIFIED CATARACT: Chronic | ICD-10-CM

## 2019-01-08 DIAGNOSIS — I48.91 UNSPECIFIED ATRIAL FIBRILLATION: ICD-10-CM

## 2019-01-08 DIAGNOSIS — Z90.89 ACQUIRED ABSENCE OF OTHER ORGANS: Chronic | ICD-10-CM

## 2019-01-08 LAB
BUN SERPL-MCNC: 12 MG/DL — SIGNIFICANT CHANGE UP (ref 8–20)
CREAT SERPL-MCNC: 0.58 MG/DL — SIGNIFICANT CHANGE UP (ref 0.5–1.3)

## 2019-01-08 PROCEDURE — 84520 ASSAY OF UREA NITROGEN: CPT

## 2019-01-08 PROCEDURE — 82565 ASSAY OF CREATININE: CPT

## 2019-01-08 PROCEDURE — 36415 COLL VENOUS BLD VENIPUNCTURE: CPT

## 2019-01-18 ENCOUNTER — APPOINTMENT (OUTPATIENT)
Dept: ELECTROPHYSIOLOGY | Facility: CLINIC | Age: 64
End: 2019-01-18
Payer: MEDICARE

## 2019-01-18 PROCEDURE — 93299: CPT

## 2019-01-18 PROCEDURE — 93298 REM INTERROG DEV EVAL SCRMS: CPT

## 2019-02-04 DIAGNOSIS — E83.51 HYPOCALCEMIA: ICD-10-CM

## 2019-02-12 ENCOUNTER — OUTPATIENT (OUTPATIENT)
Dept: OUTPATIENT SERVICES | Facility: HOSPITAL | Age: 64
LOS: 1 days | End: 2019-02-12
Payer: MEDICARE

## 2019-02-12 VITALS
TEMPERATURE: 97 F | RESPIRATION RATE: 18 BRPM | HEART RATE: 65 BPM | HEIGHT: 67 IN | WEIGHT: 149.91 LBS | OXYGEN SATURATION: 95 % | SYSTOLIC BLOOD PRESSURE: 123 MMHG | DIASTOLIC BLOOD PRESSURE: 75 MMHG

## 2019-02-12 DIAGNOSIS — H26.9 UNSPECIFIED CATARACT: Chronic | ICD-10-CM

## 2019-02-12 DIAGNOSIS — Z90.89 ACQUIRED ABSENCE OF OTHER ORGANS: Chronic | ICD-10-CM

## 2019-02-12 DIAGNOSIS — Z98.890 OTHER SPECIFIED POSTPROCEDURAL STATES: Chronic | ICD-10-CM

## 2019-02-12 DIAGNOSIS — Z01.810 ENCOUNTER FOR PREPROCEDURAL CARDIOVASCULAR EXAMINATION: ICD-10-CM

## 2019-02-12 LAB
ALBUMIN SERPL ELPH-MCNC: 4 G/DL — SIGNIFICANT CHANGE UP (ref 3.3–5.2)
ALP SERPL-CCNC: 65 U/L — SIGNIFICANT CHANGE UP (ref 40–120)
ALT FLD-CCNC: 17 U/L — SIGNIFICANT CHANGE UP
ANION GAP SERPL CALC-SCNC: 10 MMOL/L — SIGNIFICANT CHANGE UP (ref 5–17)
APTT BLD: 32.6 SEC — SIGNIFICANT CHANGE UP (ref 27.5–36.3)
AST SERPL-CCNC: 20 U/L — SIGNIFICANT CHANGE UP
BILIRUB SERPL-MCNC: <0.2 MG/DL — LOW (ref 0.4–2)
BLD GP AB SCN SERPL QL: SIGNIFICANT CHANGE UP
BUN SERPL-MCNC: 17 MG/DL — SIGNIFICANT CHANGE UP (ref 8–20)
CALCIUM SERPL-MCNC: 8.8 MG/DL — SIGNIFICANT CHANGE UP (ref 8.6–10.2)
CHLORIDE SERPL-SCNC: 98 MMOL/L — SIGNIFICANT CHANGE UP (ref 98–107)
CO2 SERPL-SCNC: 27 MMOL/L — SIGNIFICANT CHANGE UP (ref 22–29)
CREAT SERPL-MCNC: 0.57 MG/DL — SIGNIFICANT CHANGE UP (ref 0.5–1.3)
GLUCOSE SERPL-MCNC: 99 MG/DL — SIGNIFICANT CHANGE UP (ref 70–115)
HCT VFR BLD CALC: 36.9 % — LOW (ref 42–52)
HGB BLD-MCNC: 12.6 G/DL — LOW (ref 14–18)
INR BLD: 1.08 RATIO — SIGNIFICANT CHANGE UP (ref 0.88–1.16)
MAGNESIUM SERPL-MCNC: 2.3 MG/DL — SIGNIFICANT CHANGE UP (ref 1.6–2.6)
MCHC RBC-ENTMCNC: 32.6 PG — HIGH (ref 27–31)
MCHC RBC-ENTMCNC: 34.1 G/DL — SIGNIFICANT CHANGE UP (ref 32–36)
MCV RBC AUTO: 95.3 FL — HIGH (ref 80–94)
PLATELET # BLD AUTO: 172 K/UL — SIGNIFICANT CHANGE UP (ref 150–400)
POTASSIUM SERPL-MCNC: 5.2 MMOL/L — SIGNIFICANT CHANGE UP (ref 3.5–5.3)
POTASSIUM SERPL-SCNC: 5.2 MMOL/L — SIGNIFICANT CHANGE UP (ref 3.5–5.3)
PROT SERPL-MCNC: 7 G/DL — SIGNIFICANT CHANGE UP (ref 6.6–8.7)
PROTHROM AB SERPL-ACNC: 12.4 SEC — SIGNIFICANT CHANGE UP (ref 10–12.9)
RBC # BLD: 3.87 M/UL — LOW (ref 4.6–6.2)
RBC # FLD: 14.7 % — SIGNIFICANT CHANGE UP (ref 11–15.6)
SODIUM SERPL-SCNC: 135 MMOL/L — SIGNIFICANT CHANGE UP (ref 135–145)
TYPE + AB SCN PNL BLD: SIGNIFICANT CHANGE UP
WBC # BLD: 4.8 K/UL — SIGNIFICANT CHANGE UP (ref 4.8–10.8)
WBC # FLD AUTO: 4.8 K/UL — SIGNIFICANT CHANGE UP (ref 4.8–10.8)

## 2019-02-12 PROCEDURE — 93010 ELECTROCARDIOGRAM REPORT: CPT

## 2019-02-12 RX ORDER — ESOMEPRAZOLE MAGNESIUM 40 MG/1
1 CAPSULE, DELAYED RELEASE ORAL
Qty: 0 | Refills: 0 | COMMUNITY

## 2019-02-12 NOTE — H&P PST ADULT - RS GEN PE MLT RESP DETAILS PC
no chest wall tenderness/airway patent/breath sounds equal/good air movement/respirations non-labored/clear to auscultation bilaterally

## 2019-02-12 NOTE — H&P PST ADULT - HISTORY OF PRESENT ILLNESS
History obtained from chart. Patient poor historian    63 year old gentleman with history of mental retardation, HTN, bipolar disease, seizure d/o (last seizure >10 years ago, right cervical carotid pseudoaneurysm, stroke and history of atrial flutter, PVCs s/p prior ablation and cardiomyopathy with recovered LV function. (CHADSVASc score =3). Due to his cognitive disability and frequent falls, he is not a good candidate for long-term anticoagulation. We therefore discussed the potential for percutaneous left atrial appendage occlusion (eg the Watchman procedure) as an alternative to long-term anticoagulation if indicated. Loop recorder implant was performed in June of 2018 and reveals paroxysms of AFib. A HEIDI was attempted but the HEIDI probe could not be passed. Therefore a CT was performed to assess LAURE anatomy.     ** Note: The patient’s mother is his health care proxy and signs consents.**    TTE 11/9/17: EF 53%, inferoseptal WMA, mild-mod MR, mild AI  Stress Test 10/22/15: no ischemia  CT Angio: 6/8/19  LEFT ATRIAL APPENDAGE:  Morphology: cactus  Size of ostium: 1.9 x 1.6cm.  Shape of ostium: round  Complete opacification : Yes. No evidence of clot. History obtained from chart. Patient poor historian    63 year old gentleman with history of mental retardation, HTN, bipolar disease, seizure d/o (last seizure >10 years ago, right cervical carotid pseudoaneurysm, stroke and history of atrial flutter, PVCs s/p prior ablation and cardiomyopathy with recovered LV function. (CHADSVASc score=3). Due to his cognitive disability and frequent falls, he is not a good candidate for long-term anticoagulation. We therefore discussed the potential for percutaneous left atrial appendage occlusion (eg the Watchman procedure) as an alternative to long-term anticoagulation if indicated. Loop recorder implant was performed in June of 2018 and reveals paroxysms of AFib. A HEIDI was attempted but the HEIDI probe could not be passed. Therefore a CT was performed to assess LAURE anatomy. GI evaluation recommended.     Patient underwent an esophagram and modified barium study on 2/8/19. Seen at [facility] [phone]. A discussion between Dr. Ivy and Rebecca HAMLIN clarified that a HEIDI probe may be attempted to passed again while on the table. No further recommendations by GI.     ** Note: The patient’s mother is his health care proxy and signs consents. Jayna Alvarenga [phone]**    TTE 11/9/17: EF 53%, inferoseptal WMA, mild-mod MR, mild AI  Stress Test 10/22/15: no ischemia  CT Angio: 6/8/19  LEFT ATRIAL APPENDAGE:  Morphology: cactus  Size of ostium: 1.9 x 1.6cm.  Shape of ostium: round  Complete opacification : Yes. No evidence of clot.

## 2019-02-12 NOTE — ASU PATIENT PROFILE, ADULT - PSH
Bilateral cataracts  s/p removal  H/O cardiac radiofrequency ablation  [facility]-Dr Barrientos 2/2016  H/O tracheostomy  successful removal  History of loop recorder  june 2018  [facility]  dr Ivy  S/P tonsillectomy

## 2019-02-12 NOTE — H&P PST ADULT - ASSESSMENT
63 year old gentleman with history of mental retardation, HTN, bipolar disease, seizure d/o (last seizure >10 years ago, right cervical carotid pseudoaneurysm, stroke and history of atrial flutter, PVCs s/p prior ablation and cardiomyopathy with recovered LV function. (CHADSVASc score=3). Plan for HEIID with Watchman device implantation    - NPO post midnight  - Will start DOAC post procedure for 6 weeks till follow up HEIDI. ASA and Plavix for 6 months afterwards.

## 2019-02-12 NOTE — ASU PATIENT PROFILE, ADULT - FALL HARM RISK
other/seizure  meds,  and disorder,  grp home  has  adaptive equipment,  staff manages  safety needs of  client

## 2019-02-15 ENCOUNTER — INPATIENT (INPATIENT)
Facility: HOSPITAL | Age: 64
LOS: 0 days | Discharge: HOME | DRG: 274 | End: 2019-02-16
Attending: STUDENT IN AN ORGANIZED HEALTH CARE EDUCATION/TRAINING PROGRAM | Admitting: STUDENT IN AN ORGANIZED HEALTH CARE EDUCATION/TRAINING PROGRAM
Payer: MEDICARE

## 2019-02-15 ENCOUNTER — TRANSCRIPTION ENCOUNTER (OUTPATIENT)
Age: 64
End: 2019-02-15

## 2019-02-15 VITALS
TEMPERATURE: 98 F | RESPIRATION RATE: 17 BRPM | SYSTOLIC BLOOD PRESSURE: 127 MMHG | DIASTOLIC BLOOD PRESSURE: 60 MMHG | OXYGEN SATURATION: 100 % | HEART RATE: 56 BPM

## 2019-02-15 DIAGNOSIS — Z98.890 OTHER SPECIFIED POSTPROCEDURAL STATES: Chronic | ICD-10-CM

## 2019-02-15 DIAGNOSIS — Z01.810 ENCOUNTER FOR PREPROCEDURAL CARDIOVASCULAR EXAMINATION: ICD-10-CM

## 2019-02-15 DIAGNOSIS — I48.91 UNSPECIFIED ATRIAL FIBRILLATION: ICD-10-CM

## 2019-02-15 DIAGNOSIS — H26.9 UNSPECIFIED CATARACT: Chronic | ICD-10-CM

## 2019-02-15 DIAGNOSIS — Z90.89 ACQUIRED ABSENCE OF OTHER ORGANS: Chronic | ICD-10-CM

## 2019-02-15 LAB — BLD GP AB SCN SERPL QL: SIGNIFICANT CHANGE UP

## 2019-02-15 PROCEDURE — 33340 PERQ CLSR TCAT L ATR APNDGE: CPT | Mod: Q0

## 2019-02-15 RX ORDER — LORATADINE 10 MG/1
10 TABLET ORAL DAILY
Qty: 0 | Refills: 0 | Status: DISCONTINUED | OUTPATIENT
Start: 2019-02-15 | End: 2019-02-16

## 2019-02-15 RX ORDER — CELECOXIB 200 MG/1
200 CAPSULE ORAL DAILY
Qty: 0 | Refills: 0 | Status: DISCONTINUED | OUTPATIENT
Start: 2019-02-15 | End: 2019-02-16

## 2019-02-15 RX ORDER — METOPROLOL TARTRATE 50 MG
25 TABLET ORAL DAILY
Qty: 0 | Refills: 0 | Status: DISCONTINUED | OUTPATIENT
Start: 2019-02-15 | End: 2019-02-16

## 2019-02-15 RX ORDER — LEVOTHYROXINE SODIUM 125 MCG
112 TABLET ORAL DAILY
Qty: 0 | Refills: 0 | Status: DISCONTINUED | OUTPATIENT
Start: 2019-02-15 | End: 2019-02-16

## 2019-02-15 RX ORDER — VALPROIC ACID (AS SODIUM SALT) 250 MG/5ML
250 SOLUTION, ORAL ORAL THREE TIMES A DAY
Qty: 0 | Refills: 0 | Status: DISCONTINUED | OUTPATIENT
Start: 2019-02-15 | End: 2019-02-16

## 2019-02-15 RX ORDER — APIXABAN 2.5 MG/1
5 TABLET, FILM COATED ORAL EVERY 12 HOURS
Qty: 0 | Refills: 0 | Status: DISCONTINUED | OUTPATIENT
Start: 2019-02-16 | End: 2019-02-16

## 2019-02-15 RX ORDER — ACETAMINOPHEN 500 MG
650 TABLET ORAL EVERY 6 HOURS
Qty: 0 | Refills: 0 | Status: DISCONTINUED | OUTPATIENT
Start: 2019-02-15 | End: 2019-02-16

## 2019-02-15 RX ORDER — SERTRALINE 25 MG/1
150 TABLET, FILM COATED ORAL DAILY
Qty: 0 | Refills: 0 | Status: DISCONTINUED | OUTPATIENT
Start: 2019-02-15 | End: 2019-02-16

## 2019-02-15 RX ORDER — BENZOCAINE AND MENTHOL 5; 1 G/100ML; G/100ML
1 LIQUID ORAL EVERY 4 HOURS
Qty: 0 | Refills: 0 | Status: DISCONTINUED | OUTPATIENT
Start: 2019-02-15 | End: 2019-02-16

## 2019-02-15 RX ORDER — APIXABAN 2.5 MG/1
5 TABLET, FILM COATED ORAL
Qty: 90 | Refills: 0 | OUTPATIENT
Start: 2019-02-15

## 2019-02-15 RX ORDER — ASCORBIC ACID 60 MG
500 TABLET,CHEWABLE ORAL DAILY
Qty: 0 | Refills: 0 | Status: DISCONTINUED | OUTPATIENT
Start: 2019-02-15 | End: 2019-02-16

## 2019-02-15 RX ORDER — PHENOBARBITAL 60 MG
64.8 TABLET ORAL
Qty: 0 | Refills: 0 | Status: DISCONTINUED | OUTPATIENT
Start: 2019-02-15 | End: 2019-02-16

## 2019-02-15 RX ORDER — ONDANSETRON 8 MG/1
4 TABLET, FILM COATED ORAL EVERY 6 HOURS
Qty: 0 | Refills: 0 | Status: DISCONTINUED | OUTPATIENT
Start: 2019-02-15 | End: 2019-02-16

## 2019-02-15 RX ORDER — ASPIRIN/CALCIUM CARB/MAGNESIUM 324 MG
81 TABLET ORAL DAILY
Qty: 0 | Refills: 0 | Status: DISCONTINUED | OUTPATIENT
Start: 2019-02-15 | End: 2019-02-16

## 2019-02-15 RX ADMIN — Medication 250 MILLIGRAM(S): at 15:40

## 2019-02-15 RX ADMIN — Medication 64.8 MILLIGRAM(S): at 18:07

## 2019-02-15 RX ADMIN — Medication 250 MILLIGRAM(S): at 21:52

## 2019-02-15 RX ADMIN — Medication 125 MILLIGRAM(S): at 19:41

## 2019-02-15 NOTE — DISCHARGE NOTE ADULT - CARE PROVIDERS DIRECT ADDRESSES
[email or web address]

## 2019-02-15 NOTE — PROGRESS NOTE ADULT - SUBJECTIVE AND OBJECTIVE BOX
Admission Criteria  Please admit the patient to the following service: CARDIOLOGY      Major Criteria:  - Continuous EKG monitoring is required for condition causing arrhythmia (hyperkalemia, etc)  - Significant volume load > 200 ml    Admit to: 3GUL     Patient is being admitted to the inpatient service due to high risk characteristics and need for further management/monitoring and is considered to be at a significantly increased risk of major adverse cardiac and vascular events if discharged.

## 2019-02-15 NOTE — DISCHARGE NOTE ADULT - PATIENT PORTAL LINK FT
You can access the [facility] Patient Portal, offered by [facility], by registering with the following website: [email or web address]

## 2019-02-15 NOTE — DISCHARGE NOTE ADULT - CARE PROVIDER_API CALL
Francisco Ivy)  Cardiology; Internal Medicine  [address] [facility], [address] [place] [address]  Phone: [phone]  Fax: [phone]  Follow Up Time:

## 2019-02-15 NOTE — DISCHARGE NOTE ADULT - CARE PLAN
Principal Discharge DX:	Paroxysmal atrial fibrillation  Goal:	s/p LAURE occlusion with watchman device; optimize cardiac health  Assessment and plan of treatment:	- Bruising at the groin, sometimes extending down the leg, and/or a small lump under the skin at the groin access site is normal and will resolve within 2 – 3 weeks.   - Occasional skipped beats or palpitations that last for a few beats are common and generally resolve within 1-2 months.   - You may walk and take stairs at a regular pace.   - Do not perform any exercise more strenuous than walking for 1 week.   - Do not strain or lift heavy objects for 1 week.  - You may shower the day after the procedure.  - Do not soak in water (such as tub baths, hot tubs, swimming, etc.) for 1 week.   - You may resume all other activities the day after the procedure.  Call your doctor if:   - you notice bleeding, redness, drainage, swelling, increased tenderness or a hot sensation around the catheter insertion site.   - your temperature is greater than 100 degrees F for more than 24 hours.  - your rapid heart rhythm returns.  - you have any questions or concerns regarding the procedure.  If significant bleeding and/or a large lump (the size of a golf ball or bigger) occurs:  - Lie flat and apply continuous direct pressure just above the puncture site for at least 10 minutes  - If the issue resolves, notify your physician immediately.    - If the bleeding cannot be controlled, please seek immediate medical attention.  If you experience increased difficulty breathing or chest pain, or if you faint or have dizzy spells, please seek immediate medical attention.

## 2019-02-15 NOTE — DISCHARGE NOTE ADULT - PLAN OF CARE
s/p LAURE occlusion with watchman device; optimize cardiac health - Bruising at the groin, sometimes extending down the leg, and/or a small lump under the skin at the groin access site is normal and will resolve within 2 – 3 weeks.   - Occasional skipped beats or palpitations that last for a few beats are common and generally resolve within 1-2 months.   - You may walk and take stairs at a regular pace.   - Do not perform any exercise more strenuous than walking for 1 week.   - Do not strain or lift heavy objects for 1 week.  - You may shower the day after the procedure.  - Do not soak in water (such as tub baths, hot tubs, swimming, etc.) for 1 week.   - You may resume all other activities the day after the procedure.  Call your doctor if:   - you notice bleeding, redness, drainage, swelling, increased tenderness or a hot sensation around the catheter insertion site.   - your temperature is greater than 100 degrees F for more than 24 hours.  - your rapid heart rhythm returns.  - you have any questions or concerns regarding the procedure.  If significant bleeding and/or a large lump (the size of a golf ball or bigger) occurs:  - Lie flat and apply continuous direct pressure just above the puncture site for at least 10 minutes  - If the issue resolves, notify your physician immediately.    - If the bleeding cannot be controlled, please seek immediate medical attention.  If you experience increased difficulty breathing or chest pain, or if you faint or have dizzy spells, please seek immediate medical attention.

## 2019-02-15 NOTE — DISCHARGE NOTE ADULT - MEDICATION SUMMARY - MEDICATIONS TO TAKE
I will START or STAY ON the medications listed below when I get home from the hospital:    refresh  0.5%  drops  -- 1 drop(s) in each eye 4 times a day, As Needed   -- Indication: For Dry eyes    aspirin 81 mg oral tablet  -- 1 tab(s) by mouth once a day  -- Indication: For Antiplatelet    meloxicam 15 mg oral tablet  -- 1 tab(s) by mouth once a day  -- Indication: For Pain    Dilantin-125 oral suspension  -- 5 milliliter(s) by mouth 2 times a day  -- Indication: For seizure disorder    Eliquis 5 mg oral tablet  -- 5 milligram(s) by mouth 2 times a day   -- Check with your doctor before becoming pregnant.  It is very important that you take or use this exactly as directed.  Do not skip doses or discontinue unless directed by your doctor.  Obtain medical advice before taking any non-prescription drugs as some may affect the action of this medication.    -- Indication: For Anticoagulation    PHENobarbital 32.4 mg oral tablet  -- 2 tab(s) by mouth 2 times a day  -- Indication: For seizure disorder    Depakene 250 mg/5 mL oral syrup  -- 5 milliliter(s) by mouth 3 times a day  -- Indication: For seizure disorder    sertraline 100 mg oral tablet  -- 1.5 tab(s) by mouth once a day  -- Indication: For mood    loratadine 10 mg oral tablet  -- 1 tab(s) by mouth once a day  -- Indication: For Antihistamine    Metoprolol Succinate ER 25 mg oral tablet, extended release  -- 1 tab(s) by mouth once a day  -- Indication: For heart    Prolia 60 mg/mL subcutaneous solution  -- subcutaneous every 6 months  -- Indication: For osteoporosis    olopatadine 0.1% ophthalmic solution  -- 1 drop(s) to each affected eye 2 times a day, As Needed  -- Indication: For Allergic conjunctivitis    levothyroxine 112 mcg (0.112 mg) oral tablet  -- 1 tab(s) by mouth once a day  -- Indication: For hypothyroid    Multiple Vitamins oral tablet  -- 1 tab(s) by mouth once a day  -- Indication: For supplement    Vitamin C 500 mg oral tablet  -- 1 tab(s) by mouth once a day  -- Indication: For supplement

## 2019-02-15 NOTE — DISCHARGE NOTE ADULT - HOSPITAL COURSE
63 year old gentleman with history of mental retardation, HTN, bipolar disease, seizure d/o (last seizure >10 years ago, right cervical carotid pseudoaneurysm, stroke and history of atrial flutter, PVCs s/p prior ablation and cardiomyopathy with recovered LV function. (CHADSVASc score=3).  Now s/p uncomplicated LAURE occlusion with Watchman device.

## 2019-02-15 NOTE — DISCHARGE NOTE ADULT - ADDITIONAL INSTRUCTIONS
Follow up with Dr. Ivy in 2 weeks.  Our office will contact you in 3-5 days to schedule this appointment. Please call [phone] with questions or concerns.

## 2019-02-15 NOTE — PROGRESS NOTE ADULT - SUBJECTIVE AND OBJECTIVE BOX
PROCEDURE: LAURE Occlusion via Watchman Device    ELECTRPHYSIOLOGIST: Francisco Ivy MD         COMPLICATIONS:  none        DISPOSITION:  observation     CONDITION: stable      Pt doing well s/p LAURE appendage occlusion with Watchman device implant.  Denies complaint at this time.       I/O: 1500ml/0    MEDICATIONS  (STANDING):  ascorbic acid 500 milliGRAM(s) Oral daily  aspirin enteric coated 81 milliGRAM(s) Oral daily  celecoxib 200 milliGRAM(s) Oral daily  levothyroxine 112 MICROGram(s) Oral daily  loratadine 10 milliGRAM(s) Oral daily  metoprolol succinate ER 25 milliGRAM(s) Oral daily  multivitamin 1 Tablet(s) Oral daily  PHENobarbital 64.8 milliGRAM(s) Oral two times a day  phenytoin   Suspension 125 milliGRAM(s) Oral two times a day  sertraline 150 milliGRAM(s) Oral daily  valproic  acid Syrup 250 milliGRAM(s) Oral three times a day    MEDICATIONS  (PRN):  acetaminophen   Tablet .. 650 milliGRAM(s) Oral every 6 hours PRN Mild Pain (1 - 3)  aluminum hydroxide/magnesium hydroxide/simethicone Suspension 30 milliLiter(s) Oral every 6 hours PRN Dyspepsia  benzocaine 15 mG/menthol 3.6 mG (Sugar-Free) Lozenge 1 Lozenge Oral every 4 hours PRN Sore Throat  ondansetron Injectable 4 milliGRAM(s) IV Push every 6 hours PRN Nausea and/or Vomiting      Allergies:  codeine (Unknown)      Exam:   T(C): 36.4 (02-15-19 @ 10:20), Max: 36.4 (02-15-19 @ 10:20)  HR: 56 (02-15-19 @ 10:20) (56 - 56)  BP: 149/66 (02-15-19 @ 14:00) (127/60 - 149/66)  RR: 17 (02-15-19 @ 10:20) (17 - 17)  SpO2: 100% (02-15-19 @ 10:20) (100% - 100%)    VSS, NAD, A&O x 2  Card: S1/S2, RRR, no m/g/r  Resp: lungs CTA b/l  Abd: S/NT/ND  Groin (right only): hemostatic suture in place; site C/D/I; no bleeding, hematoma, erythema, exudate or edema  Ext: no edema; distal pulses intact    ECG: Sinus rhythm, PVC     Assessment:   63 year old gentleman with history of mental retardation, HTN, bipolar disease, seizure d/o (last seizure >10 years ago, right cervical carotid pseudoaneurysm, stroke and history of atrial flutter, PVCs s/p prior ablation and cardiomyopathy with recovered LV function. (CHADSVASc score=3).  Now s/p uncomplicated LAURE occlusion with Watchman device.      Plan:   Bedrest x 6 hours, then OOB with assistance and progress as tolerated.   Groin suture to be removed by EP service in AM.   Radial art line to be removed once pt fully awake with stable vitals >1 hour post op.   Pending groin status: Eliquis 5mg PO BID in AM.   Continue other home medications.   Strict I/Os.  Please encourage incentive spirometry and ambulation once able.  Observation and monitoring on telemetry overnight with anticipated discharge in the AM and outpt follow up in 1 month.

## 2019-02-16 VITALS — HEART RATE: 68 BPM | SYSTOLIC BLOOD PRESSURE: 110 MMHG | DIASTOLIC BLOOD PRESSURE: 58 MMHG

## 2019-02-16 LAB
ANION GAP SERPL CALC-SCNC: 13 MMOL/L — SIGNIFICANT CHANGE UP (ref 5–17)
BUN SERPL-MCNC: 20 MG/DL — SIGNIFICANT CHANGE UP (ref 8–20)
CALCIUM SERPL-MCNC: 8.2 MG/DL — LOW (ref 8.6–10.2)
CHLORIDE SERPL-SCNC: 102 MMOL/L — SIGNIFICANT CHANGE UP (ref 98–107)
CO2 SERPL-SCNC: 24 MMOL/L — SIGNIFICANT CHANGE UP (ref 22–29)
CREAT SERPL-MCNC: 0.58 MG/DL — SIGNIFICANT CHANGE UP (ref 0.5–1.3)
GLUCOSE SERPL-MCNC: 100 MG/DL — SIGNIFICANT CHANGE UP (ref 70–115)
HCT VFR BLD CALC: 35.9 % — LOW (ref 42–52)
HGB BLD-MCNC: 12.1 G/DL — LOW (ref 14–18)
MAGNESIUM SERPL-MCNC: 2.2 MG/DL — SIGNIFICANT CHANGE UP (ref 1.6–2.6)
MCHC RBC-ENTMCNC: 32.2 PG — HIGH (ref 27–31)
MCHC RBC-ENTMCNC: 33.7 G/DL — SIGNIFICANT CHANGE UP (ref 32–36)
MCV RBC AUTO: 95.5 FL — HIGH (ref 80–94)
PLATELET # BLD AUTO: 172 K/UL — SIGNIFICANT CHANGE UP (ref 150–400)
POTASSIUM SERPL-MCNC: 4 MMOL/L — SIGNIFICANT CHANGE UP (ref 3.5–5.3)
POTASSIUM SERPL-SCNC: 4 MMOL/L — SIGNIFICANT CHANGE UP (ref 3.5–5.3)
RBC # BLD: 3.76 M/UL — LOW (ref 4.6–6.2)
RBC # FLD: 14.9 % — SIGNIFICANT CHANGE UP (ref 11–15.6)
SODIUM SERPL-SCNC: 139 MMOL/L — SIGNIFICANT CHANGE UP (ref 135–145)
WBC # BLD: 9.7 K/UL — SIGNIFICANT CHANGE UP (ref 4.8–10.8)
WBC # FLD AUTO: 9.7 K/UL — SIGNIFICANT CHANGE UP (ref 4.8–10.8)

## 2019-02-16 PROCEDURE — 86900 BLOOD TYPING SEROLOGIC ABO: CPT

## 2019-02-16 PROCEDURE — 93320 DOPPLER ECHO COMPLETE: CPT

## 2019-02-16 PROCEDURE — 93005 ELECTROCARDIOGRAM TRACING: CPT

## 2019-02-16 PROCEDURE — 85610 PROTHROMBIN TIME: CPT

## 2019-02-16 PROCEDURE — 86901 BLOOD TYPING SEROLOGIC RH(D): CPT

## 2019-02-16 PROCEDURE — 80053 COMPREHEN METABOLIC PANEL: CPT

## 2019-02-16 PROCEDURE — C1893: CPT

## 2019-02-16 PROCEDURE — C1730: CPT

## 2019-02-16 PROCEDURE — C1894: CPT

## 2019-02-16 PROCEDURE — C1887: CPT

## 2019-02-16 PROCEDURE — 93325 DOPPLER ECHO COLOR FLOW MAPG: CPT

## 2019-02-16 PROCEDURE — 93010 ELECTROCARDIOGRAM REPORT: CPT

## 2019-02-16 PROCEDURE — 85730 THROMBOPLASTIN TIME PARTIAL: CPT

## 2019-02-16 PROCEDURE — G0463: CPT

## 2019-02-16 PROCEDURE — 80048 BASIC METABOLIC PNL TOTAL CA: CPT

## 2019-02-16 PROCEDURE — 83735 ASSAY OF MAGNESIUM: CPT

## 2019-02-16 PROCEDURE — 86850 RBC ANTIBODY SCREEN: CPT

## 2019-02-16 PROCEDURE — C1898: CPT

## 2019-02-16 PROCEDURE — C1889: CPT

## 2019-02-16 PROCEDURE — 86923 COMPATIBILITY TEST ELECTRIC: CPT

## 2019-02-16 PROCEDURE — C1785: CPT

## 2019-02-16 PROCEDURE — 85027 COMPLETE CBC AUTOMATED: CPT

## 2019-02-16 PROCEDURE — 93312 ECHO TRANSESOPHAGEAL: CPT

## 2019-02-16 PROCEDURE — C1892: CPT

## 2019-02-16 PROCEDURE — 36415 COLL VENOUS BLD VENIPUNCTURE: CPT

## 2019-02-16 RX ORDER — APIXABAN 2.5 MG/1
5 TABLET, FILM COATED ORAL
Qty: 90 | Refills: 3 | OUTPATIENT
Start: 2019-02-16

## 2019-02-16 RX ADMIN — Medication 112 MICROGRAM(S): at 06:31

## 2019-02-16 RX ADMIN — Medication 64.8 MILLIGRAM(S): at 06:32

## 2019-02-16 RX ADMIN — Medication 250 MILLIGRAM(S): at 06:32

## 2019-02-16 RX ADMIN — Medication 125 MILLIGRAM(S): at 06:32

## 2019-02-16 RX ADMIN — Medication 25 MILLIGRAM(S): at 06:31

## 2019-02-16 RX ADMIN — APIXABAN 5 MILLIGRAM(S): 2.5 TABLET, FILM COATED ORAL at 06:31

## 2019-02-16 NOTE — PROGRESS NOTE ADULT - SUBJECTIVE AND OBJECTIVE BOX
Cardiology NP note:     Pt doing well POD #1 s/p Watchman device placement. Denies complaint.     EKG: NSR with 1st degree AV block 77; PVCs  TELE: same as EKG 70s    MEDICATIONS  (STANDING):  apixaban 5 milliGRAM(s) Oral every 12 hours  ascorbic acid 500 milliGRAM(s) Oral daily  aspirin enteric coated 81 milliGRAM(s) Oral daily  celecoxib 200 milliGRAM(s) Oral daily  levothyroxine 112 MICROGram(s) Oral daily  loratadine 10 milliGRAM(s) Oral daily  metoprolol succinate ER 25 milliGRAM(s) Oral daily  multivitamin 1 Tablet(s) Oral daily  PHENobarbital 64.8 milliGRAM(s) Oral two times a day  phenytoin   Suspension 125 milliGRAM(s) Oral two times a day  sertraline 150 milliGRAM(s) Oral daily  valproic  acid Syrup 250 milliGRAM(s) Oral three times a day    MEDICATIONS  (PRN):  acetaminophen   Tablet .. 650 milliGRAM(s) Oral every 6 hours PRN Mild Pain (1 - 3)  aluminum hydroxide/magnesium hydroxide/simethicone Suspension 30 milliLiter(s) Oral every 6 hours PRN Dyspepsia  benzocaine 15 mG/menthol 3.6 mG (Sugar-Free) Lozenge 1 Lozenge Oral every 4 hours PRN Sore Throat  ondansetron Injectable 4 milliGRAM(s) IV Push every 6 hours PRN Nausea and/or Vomiting      Allergies:  codeine (Unknown)      PAST MEDICAL & SURGICAL HISTORY:  Seizure disorder  Hypothyroid  PVC (premature ventricular contraction): s/p pvc ablation by Dr Barrientos 2/2016  Cardiomyopathy: recovered LV function  Atrial flutter: in setting of sepsis  CVA (cerebral vascular accident)  Pseudoaneurysm: right cerviacl carotid  Bipolar disorder  HTN (hypertension)  Mental retardation  History of loop recorder: june 2018  [facility]  dr Ivy  H/O tracheostomy: successful removal  S/P tonsillectomy  Bilateral cataracts: s/p removal  H/O cardiac radiofrequency ablation: PVC-Dr Barrientos 2/2016      Vital Signs Last 24 Hrs  T(C): --  T(F): --  HR: 68 (16 Feb 2019 10:27) (63 - 89)  BP: 110/58 (16 Feb 2019 10:27) (104/65 - 149/66)  BP(mean): --  RR: 16 (16 Feb 2019 06:40) (14 - 18)  SpO2: 98% (16 Feb 2019 06:40) (96% - 100%)    Physical Exam:  Constitutional: NAD, AAOx3  Cardiovascular: +S1S2 RRR  Pulmonary: CTA b/l, unlabored  Abd: soft NTND +BS  Right Groin: C/D/I; Figure 8 suture removed; no bleeding, hematoma, edema  Extremities: no pedal edema, +distal pulses b/l  Neuro: non focal, BORGES x4    LABS:                        12.1   9.7   )-----------( 172      ( 16 Feb 2019 06:01 )             35.9     02-16    139  |  102  |  20.0  ----------------------------<  100  4.0   |  24.0  |  0.58    Ca    8.2<L>      16 Feb 2019 06:01  Mg     2.2     02-16            Assessment:   63 year old gentleman with history of mental retardation, HTN, bipolar disease, seizure d/o (last seizure >10 years ago, right cervical carotid pseudoaneurysm, stroke and history of atrial flutter, PVCs s/p prior ablation and cardiomyopathy with recovered LV function. (CHADSVASc score=3).  Now s/p uncomplicated LAURE occlusion with Watchman device.      Plan:   -Stable for discharge home today.  -Follow up with Dr. Ivy in two weeks.  -Access site care and activity limitations reviewed w/ pt.   -Continue home meds as before including Eliquis

## 2019-02-19 ENCOUNTER — INBOUND DOCUMENT (OUTPATIENT)
Age: 64
End: 2019-02-19

## 2019-02-22 ENCOUNTER — APPOINTMENT (OUTPATIENT)
Dept: ELECTROPHYSIOLOGY | Facility: CLINIC | Age: 64
End: 2019-02-22
Payer: MEDICARE

## 2019-02-22 PROCEDURE — 93298 REM INTERROG DEV EVAL SCRMS: CPT

## 2019-02-22 PROCEDURE — 93299: CPT

## 2019-03-14 ENCOUNTER — APPOINTMENT (OUTPATIENT)
Dept: ELECTROPHYSIOLOGY | Facility: CLINIC | Age: 64
End: 2019-03-14
Payer: MEDICARE

## 2019-03-14 ENCOUNTER — APPOINTMENT (OUTPATIENT)
Dept: ELECTROPHYSIOLOGY | Facility: CLINIC | Age: 64
End: 2019-03-14

## 2019-03-14 VITALS
WEIGHT: 151 LBS | SYSTOLIC BLOOD PRESSURE: 118 MMHG | HEIGHT: 67 IN | DIASTOLIC BLOOD PRESSURE: 62 MMHG | HEART RATE: 58 BPM | BODY MASS INDEX: 23.7 KG/M2 | OXYGEN SATURATION: 91 %

## 2019-03-14 PROCEDURE — 99214 OFFICE O/P EST MOD 30 MIN: CPT | Mod: 25

## 2019-03-14 PROCEDURE — 93000 ELECTROCARDIOGRAM COMPLETE: CPT

## 2019-03-19 ENCOUNTER — NON-APPOINTMENT (OUTPATIENT)
Age: 64
End: 2019-03-19

## 2019-03-19 NOTE — HISTORY OF PRESENT ILLNESS
[FreeTextEntry1] : 63 year old gentleman with history of mental retardation, HTN, right cervical carotid pseudoaneurysm, prior stroke, atrial flutter and PAF on ILR (CHADSVASc score of 3 - HTN, prior stroke), with recurrent falls and unstable gait. Given this and his cognitive dysfunction he has been considered at high risk for long-term anticoagulation s/p successful LAAO with WATCHMAN implant \par Started on Eliquis following WATCHMAN placement. \par Presents today for post procedure f/up and reportedly asymptomatic. Patient and care giver deny evidence of bleeding events on Eliquis. NO recent falls.\par \par Modified Sedgwick Score = 3.\par

## 2019-03-19 NOTE — DISCUSSION/SUMMARY
[FreeTextEntry1] : 63 year old gentleman with history of mental retardation, HTN, right cervical carotid pseudoaneurysm, prior stroke, atrial flutter/PAF (CHADSVASc score of 3 - HTN, prior stroke), recurrent falls, unstable gait. and cognitive dysfunction s/p successful LAAO with WATCHMAN implant. Started on Eliquis following WATCHMAN placement which he is tolerating without bleeding complications.\par No recent falls.\par - Continue Eliquis. \par - Plan for HEIDI > 45 days post implant\par - Will assess WATCHMAN placement at that time and likely d/c Eliquis and switch to ASA/Plavix for 6 months followed by long term ASA 81mg\par \par Courtney Agosto PAC\par

## 2019-03-19 NOTE — REASON FOR VISIT
[Follow-Up - Clinic] : a clinic follow-up of [FreeTextEntry2] : PAF s/p LAAO with WATCHMAN, 2/15/2019

## 2019-03-19 NOTE — PHYSICAL EXAM
[Normal Appearance] : normal appearance [General Appearance - Well Developed] : well developed [Well Groomed] : well groomed [General Appearance - Well Nourished] : well nourished [Respiration, Rhythm And Depth] : normal respiratory rhythm and effort [Auscultation Breath Sounds / Voice Sounds] : lungs were clear to auscultation bilaterally [Exaggerated Use Of Accessory Muscles For Inspiration] : no accessory muscle use [Heart Rate And Rhythm] : heart rate and rhythm were normal [Murmurs] : no murmurs present [Heart Sounds] : normal S1 and S2 [Bowel Sounds] : normal bowel sounds [Abdomen Soft] : soft [Abnormal Walk] : normal gait [Skin Color & Pigmentation] : normal skin color and pigmentation [Nail Clubbing] : no clubbing of the fingernails [No Anxiety] : not feeling anxious [] : no rash [Oriented To Time, Place, And Person] : oriented to person, place, and time

## 2019-03-19 NOTE — REVIEW OF SYSTEMS
[Negative] : Cardiovascular [Fever] : no fever [Chills] : no chills [Feeling Fatigued] : not feeling fatigued [Shortness Of Breath] : no shortness of breath [Dyspnea on exertion] : not dyspnea during exertion [Chest  Pressure] : no chest pressure [Chest Pain] : no chest pain [Lower Ext Edema] : no extremity edema [Palpitations] : no palpitations [Cough] : no cough [Abdominal Pain] : no abdominal pain [Dizziness] : no dizziness [Easy Bleeding] : no tendency for easy bleeding

## 2019-03-25 ENCOUNTER — APPOINTMENT (OUTPATIENT)
Dept: ELECTROPHYSIOLOGY | Facility: CLINIC | Age: 64
End: 2019-03-25
Payer: MEDICARE

## 2019-03-25 PROCEDURE — 93298 REM INTERROG DEV EVAL SCRMS: CPT

## 2019-03-25 PROCEDURE — 93299: CPT

## 2019-04-04 ENCOUNTER — OUTPATIENT (OUTPATIENT)
Dept: OUTPATIENT SERVICES | Facility: HOSPITAL | Age: 64
LOS: 1 days | End: 2019-04-04
Payer: MEDICARE

## 2019-04-04 VITALS
HEART RATE: 61 BPM | TEMPERATURE: 98 F | DIASTOLIC BLOOD PRESSURE: 61 MMHG | OXYGEN SATURATION: 100 % | WEIGHT: 145.95 LBS | HEIGHT: 66 IN | SYSTOLIC BLOOD PRESSURE: 133 MMHG | RESPIRATION RATE: 18 BRPM

## 2019-04-04 DIAGNOSIS — Z98.890 OTHER SPECIFIED POSTPROCEDURAL STATES: Chronic | ICD-10-CM

## 2019-04-04 DIAGNOSIS — Z01.810 ENCOUNTER FOR PREPROCEDURAL CARDIOVASCULAR EXAMINATION: ICD-10-CM

## 2019-04-04 DIAGNOSIS — Z95.818 PRESENCE OF OTHER CARDIAC IMPLANTS AND GRAFTS: Chronic | ICD-10-CM

## 2019-04-04 DIAGNOSIS — H26.9 UNSPECIFIED CATARACT: Chronic | ICD-10-CM

## 2019-04-04 DIAGNOSIS — Z90.89 ACQUIRED ABSENCE OF OTHER ORGANS: Chronic | ICD-10-CM

## 2019-04-04 LAB
ANION GAP SERPL CALC-SCNC: 12 MMOL/L — SIGNIFICANT CHANGE UP (ref 5–17)
APTT BLD: 39.1 SEC — HIGH (ref 27.5–36.3)
BUN SERPL-MCNC: 15 MG/DL — SIGNIFICANT CHANGE UP (ref 8–20)
CALCIUM SERPL-MCNC: 8.9 MG/DL — SIGNIFICANT CHANGE UP (ref 8.6–10.2)
CHLORIDE SERPL-SCNC: 96 MMOL/L — LOW (ref 98–107)
CO2 SERPL-SCNC: 26 MMOL/L — SIGNIFICANT CHANGE UP (ref 22–29)
CREAT SERPL-MCNC: 0.6 MG/DL — SIGNIFICANT CHANGE UP (ref 0.5–1.3)
EOSINOPHIL # BLD AUTO: 0.1 K/UL — SIGNIFICANT CHANGE UP (ref 0–0.5)
EOSINOPHIL NFR BLD AUTO: 3.3 % — SIGNIFICANT CHANGE UP (ref 0–5)
GLUCOSE SERPL-MCNC: 93 MG/DL — SIGNIFICANT CHANGE UP (ref 70–115)
HCT VFR BLD CALC: 36.4 % — LOW (ref 42–52)
HGB BLD-MCNC: 12.4 G/DL — LOW (ref 14–18)
INR BLD: 1.15 RATIO — SIGNIFICANT CHANGE UP (ref 0.88–1.16)
LYMPHOCYTES # BLD AUTO: 1.5 K/UL — SIGNIFICANT CHANGE UP (ref 1–4.8)
LYMPHOCYTES # BLD AUTO: 34.5 % — SIGNIFICANT CHANGE UP (ref 20–55)
MCHC RBC-ENTMCNC: 31.9 PG — HIGH (ref 27–31)
MCHC RBC-ENTMCNC: 34.1 G/DL — SIGNIFICANT CHANGE UP (ref 32–36)
MCV RBC AUTO: 93.6 FL — SIGNIFICANT CHANGE UP (ref 80–94)
MONOCYTES # BLD AUTO: 0.6 K/UL — SIGNIFICANT CHANGE UP (ref 0–0.8)
MONOCYTES NFR BLD AUTO: 14.2 % — HIGH (ref 3–10)
NEUTROPHILS # BLD AUTO: 2 K/UL — SIGNIFICANT CHANGE UP (ref 1.8–8)
NEUTROPHILS NFR BLD AUTO: 47.8 % — SIGNIFICANT CHANGE UP (ref 37–73)
PLATELET # BLD AUTO: 164 K/UL — SIGNIFICANT CHANGE UP (ref 150–400)
POTASSIUM SERPL-MCNC: 4.8 MMOL/L — SIGNIFICANT CHANGE UP (ref 3.5–5.3)
POTASSIUM SERPL-SCNC: 4.8 MMOL/L — SIGNIFICANT CHANGE UP (ref 3.5–5.3)
PROTHROM AB SERPL-ACNC: 13.3 SEC — HIGH (ref 10–12.9)
RBC # BLD: 3.89 M/UL — LOW (ref 4.6–6.2)
RBC # FLD: 13.6 % — SIGNIFICANT CHANGE UP (ref 11–15.6)
SODIUM SERPL-SCNC: 134 MMOL/L — LOW (ref 135–145)
WBC # BLD: 4.2 K/UL — LOW (ref 4.8–10.8)
WBC # FLD AUTO: 4.2 K/UL — LOW (ref 4.8–10.8)

## 2019-04-04 PROCEDURE — 36415 COLL VENOUS BLD VENIPUNCTURE: CPT

## 2019-04-04 PROCEDURE — 80048 BASIC METABOLIC PNL TOTAL CA: CPT

## 2019-04-04 PROCEDURE — 85027 COMPLETE CBC AUTOMATED: CPT

## 2019-04-04 PROCEDURE — 85730 THROMBOPLASTIN TIME PARTIAL: CPT

## 2019-04-04 PROCEDURE — G0463: CPT

## 2019-04-04 PROCEDURE — 93005 ELECTROCARDIOGRAM TRACING: CPT

## 2019-04-04 PROCEDURE — 85610 PROTHROMBIN TIME: CPT

## 2019-04-04 PROCEDURE — 93010 ELECTROCARDIOGRAM REPORT: CPT

## 2019-04-04 NOTE — H&P PST ADULT - NSICDXPASTMEDICALHX_GEN_ALL_CORE_FT
PAST MEDICAL HISTORY:  Atrial flutter in setting of sepsis    Bipolar disorder     Cardiomyopathy recovered LV function    CVA (cerebral vascular accident)     HTN (hypertension)     Hypothyroid     Mental retardation     Pseudoaneurysm right cerviacl carotid    PVC (premature ventricular contraction) s/p pvc ablation by Dr Barrientos 2/2016    Seizure disorder

## 2019-04-04 NOTE — H&P PST ADULT - HISTORY OF PRESENT ILLNESS
History obtained from chart. Patient poor historian    63 year old gentleman with history of mental retardation, HTN, bipolar disease, seizure d/o (last seizure >10 years ago, right cervical carotid pseudoaneurysm, stroke and history of atrial flutter, PVCs s/p prior ablation and cardiomyopathy with recovered LV function. (CHADSVASc score=3). Due to his cognitive disability and frequent falls, he is not a good candidate for long-term anticoagulation.  He is, now, s/p successful percutaneous left atrial appendage occlusion with Watchman implant as an alternative to long-term anticoagulation.  Loop recorder implant was performed in June of 2018 and reveals paroxysms of AFib. A HEIDI was attempted but the HEIDI probe could not be passed. Therefore a CT was performed to assess LAURE anatomy. GI evaluation recommended.     Patient underwent an esophagram and modified barium study on 2/8/19. Seen at [facility] [phone]. A discussion between Dr. Ivy and Rebecca HAMLIN clarified that a HEIDI probe may be attempted to passed again while on the table. No further recommendations by GI.     ** Note: The patient’s mother is his health care proxy and signs consents. Jayna Alvarenga [phone]**    TTE 11/9/17: EF 53%, inferoseptal WMA, mild-mod MR, mild AI  Stress Test 10/22/15: no ischemia  CT Angio: 6/8/19  LEFT ATRIAL APPENDAGE:  Morphology: cactus  Size of ostium: 1.9 x 1.6cm.  Shape of ostium: round  Complete opacification : Yes. No evidence of clot. History obtained from chart. Patient poor historian    63 year old gentleman with history of mental retardation, HTN, bipolar disease, seizure d/o (last seizure >10 years ago, right cervical carotid pseudoaneurysm, stroke and history of atrial flutter, PVCs s/p prior ablation and cardiomyopathy with recovered LV function. (CHADSVASc score=3). Due to his cognitive disability and frequent falls, he is not a good candidate for long-term anticoagulation.  He is, now, s/p successful percutaneous left atrial appendage occlusion with Watchman implant as an alternative to long-term anticoagulation.  After one failed attempt to pass a HEIDI probe in the past, the patient underwent an esophagram and modified barium study on 2/8/19. Seen at [facility] [phone] and they felt it was safe to attempt to pass a HEIDI probe. No further recommendations by GI.  He presents today for PST for 45 day post watchman HEIDI.    ** Note: The patient’s mother is his health care proxy and signs consents. Jayna Alvarenga [phone]**    TTE 11/9/17: EF 53%, inferoseptal WMA, mild-mod MR, mild AI  Stress Test 10/22/15: no ischemia  CT Angio: 6/8/19  LEFT ATRIAL APPENDAGE:  Morphology: cactus  Size of ostium: 1.9 x 1.6cm.  Shape of ostium: round  Complete opacification : Yes. No evidence of clot.

## 2019-04-04 NOTE — H&P PST ADULT - NSICDXPASTSURGICALHX_GEN_ALL_CORE_FT
PAST SURGICAL HISTORY:  Bilateral cataracts s/p removal    H/O cardiac radiofrequency ablation PVC-Dr Barrientos 2/2016    H/O tracheostomy successful removal    History of loop recorder june 2018  [facility]  dr Ivy    Presence of Watchman left atrial appendage closure device feb  15  2019    S/P tonsillectomy

## 2019-04-04 NOTE — ASU PATIENT PROFILE, ADULT - PSH
Bilateral cataracts  s/p removal  H/O cardiac radiofrequency ablation  PVC-Dr Barrientos 2/2016  H/O tracheostomy  successful removal  History of loop recorder  june 2018  [facility]  dr Ivy  Presence of Watchman left atrial appendage closure device  feb  15  2019  S/P tonsillectomy

## 2019-04-07 ENCOUNTER — FORM ENCOUNTER (OUTPATIENT)
Age: 64
End: 2019-04-07

## 2019-04-08 ENCOUNTER — TRANSCRIPTION ENCOUNTER (OUTPATIENT)
Age: 64
End: 2019-04-08

## 2019-04-08 ENCOUNTER — OUTPATIENT (OUTPATIENT)
Dept: OUTPATIENT SERVICES | Facility: HOSPITAL | Age: 64
LOS: 1 days | End: 2019-04-08
Payer: MEDICARE

## 2019-04-08 VITALS
DIASTOLIC BLOOD PRESSURE: 56 MMHG | TEMPERATURE: 98 F | RESPIRATION RATE: 16 BRPM | OXYGEN SATURATION: 100 % | HEART RATE: 56 BPM | SYSTOLIC BLOOD PRESSURE: 125 MMHG

## 2019-04-08 VITALS
SYSTOLIC BLOOD PRESSURE: 142 MMHG | OXYGEN SATURATION: 100 % | DIASTOLIC BLOOD PRESSURE: 67 MMHG | RESPIRATION RATE: 16 BRPM | HEART RATE: 72 BPM

## 2019-04-08 DIAGNOSIS — Z90.89 ACQUIRED ABSENCE OF OTHER ORGANS: Chronic | ICD-10-CM

## 2019-04-08 DIAGNOSIS — Z98.890 OTHER SPECIFIED POSTPROCEDURAL STATES: Chronic | ICD-10-CM

## 2019-04-08 DIAGNOSIS — I48.91 UNSPECIFIED ATRIAL FIBRILLATION: ICD-10-CM

## 2019-04-08 DIAGNOSIS — H26.9 UNSPECIFIED CATARACT: Chronic | ICD-10-CM

## 2019-04-08 DIAGNOSIS — Z95.818 PRESENCE OF OTHER CARDIAC IMPLANTS AND GRAFTS: Chronic | ICD-10-CM

## 2019-04-08 LAB
ANION GAP SERPL CALC-SCNC: 8 MMOL/L — SIGNIFICANT CHANGE UP (ref 5–17)
BLD GP AB SCN SERPL QL: SIGNIFICANT CHANGE UP
BUN SERPL-MCNC: 13 MG/DL — SIGNIFICANT CHANGE UP (ref 8–20)
CALCIUM SERPL-MCNC: 8.2 MG/DL — LOW (ref 8.6–10.2)
CHLORIDE SERPL-SCNC: 101 MMOL/L — SIGNIFICANT CHANGE UP (ref 98–107)
CO2 SERPL-SCNC: 26 MMOL/L — SIGNIFICANT CHANGE UP (ref 22–29)
CREAT SERPL-MCNC: 0.69 MG/DL — SIGNIFICANT CHANGE UP (ref 0.5–1.3)
GLUCOSE SERPL-MCNC: 104 MG/DL — SIGNIFICANT CHANGE UP (ref 70–115)
HCT VFR BLD CALC: 35.9 % — LOW (ref 42–52)
HGB BLD-MCNC: 12 G/DL — LOW (ref 14–18)
MCHC RBC-ENTMCNC: 31.4 PG — HIGH (ref 27–31)
MCHC RBC-ENTMCNC: 33.4 G/DL — SIGNIFICANT CHANGE UP (ref 32–36)
MCV RBC AUTO: 94 FL — SIGNIFICANT CHANGE UP (ref 80–94)
PLATELET # BLD AUTO: 151 K/UL — SIGNIFICANT CHANGE UP (ref 150–400)
POTASSIUM SERPL-MCNC: 4.7 MMOL/L — SIGNIFICANT CHANGE UP (ref 3.5–5.3)
POTASSIUM SERPL-SCNC: 4.7 MMOL/L — SIGNIFICANT CHANGE UP (ref 3.5–5.3)
RBC # BLD: 3.82 M/UL — LOW (ref 4.6–6.2)
RBC # FLD: 13.8 % — SIGNIFICANT CHANGE UP (ref 11–15.6)
SODIUM SERPL-SCNC: 135 MMOL/L — SIGNIFICANT CHANGE UP (ref 135–145)
TYPE + AB SCN PNL BLD: SIGNIFICANT CHANGE UP
WBC # BLD: 4.4 K/UL — LOW (ref 4.8–10.8)
WBC # FLD AUTO: 4.4 K/UL — LOW (ref 4.8–10.8)

## 2019-04-08 PROCEDURE — 93320 DOPPLER ECHO COMPLETE: CPT | Mod: 26

## 2019-04-08 PROCEDURE — 93325 DOPPLER ECHO COLOR FLOW MAPG: CPT | Mod: 26

## 2019-04-08 PROCEDURE — 76376 3D RENDER W/INTRP POSTPROCES: CPT | Mod: 26

## 2019-04-08 PROCEDURE — 80048 BASIC METABOLIC PNL TOTAL CA: CPT

## 2019-04-08 PROCEDURE — 86901 BLOOD TYPING SEROLOGIC RH(D): CPT

## 2019-04-08 PROCEDURE — 93312 ECHO TRANSESOPHAGEAL: CPT

## 2019-04-08 PROCEDURE — 71045 X-RAY EXAM CHEST 1 VIEW: CPT

## 2019-04-08 PROCEDURE — 71045 X-RAY EXAM CHEST 1 VIEW: CPT | Mod: 26

## 2019-04-08 PROCEDURE — 93312 ECHO TRANSESOPHAGEAL: CPT | Mod: 26

## 2019-04-08 PROCEDURE — 36415 COLL VENOUS BLD VENIPUNCTURE: CPT

## 2019-04-08 PROCEDURE — 85027 COMPLETE CBC AUTOMATED: CPT

## 2019-04-08 PROCEDURE — 86900 BLOOD TYPING SEROLOGIC ABO: CPT

## 2019-04-08 PROCEDURE — 86850 RBC ANTIBODY SCREEN: CPT

## 2019-04-08 PROCEDURE — 93325 DOPPLER ECHO COLOR FLOW MAPG: CPT

## 2019-04-08 PROCEDURE — 93320 DOPPLER ECHO COMPLETE: CPT

## 2019-04-08 RX ORDER — SODIUM CHLORIDE 9 MG/ML
250 INJECTION INTRAMUSCULAR; INTRAVENOUS; SUBCUTANEOUS ONCE
Qty: 0 | Refills: 0 | Status: COMPLETED | OUTPATIENT
Start: 2019-04-08 | End: 2019-04-08

## 2019-04-08 RX ORDER — DOPAMINE HYDROCHLORIDE 40 MG/ML
5 INJECTION, SOLUTION, CONCENTRATE INTRAVENOUS
Qty: 400 | Refills: 0 | Status: DISCONTINUED | OUTPATIENT
Start: 2019-04-08 | End: 2019-04-08

## 2019-04-08 RX ORDER — SODIUM CHLORIDE 9 MG/ML
400 INJECTION INTRAMUSCULAR; INTRAVENOUS; SUBCUTANEOUS ONCE
Qty: 0 | Refills: 0 | Status: COMPLETED | OUTPATIENT
Start: 2019-04-08 | End: 2019-04-08

## 2019-04-08 RX ORDER — CLOPIDOGREL BISULFATE 75 MG/1
1 TABLET, FILM COATED ORAL
Qty: 30 | Refills: 6
Start: 2019-04-08

## 2019-04-08 RX ORDER — CLOPIDOGREL BISULFATE 75 MG/1
75 TABLET, FILM COATED ORAL DAILY
Qty: 0 | Refills: 0 | Status: DISCONTINUED | OUTPATIENT
Start: 2019-04-08 | End: 2019-04-23

## 2019-04-08 RX ADMIN — DOPAMINE HYDROCHLORIDE 12.42 MICROGRAM(S)/KG/MIN: 40 INJECTION, SOLUTION, CONCENTRATE INTRAVENOUS at 15:57

## 2019-04-08 RX ADMIN — CLOPIDOGREL BISULFATE 75 MILLIGRAM(S): 75 TABLET, FILM COATED ORAL at 17:51

## 2019-04-08 RX ADMIN — SODIUM CHLORIDE 800 MILLILITER(S): 9 INJECTION INTRAMUSCULAR; INTRAVENOUS; SUBCUTANEOUS at 14:40

## 2019-04-08 RX ADMIN — SODIUM CHLORIDE 500 MILLILITER(S): 9 INJECTION INTRAMUSCULAR; INTRAVENOUS; SUBCUTANEOUS at 15:58

## 2019-04-08 NOTE — DISCHARGE NOTE PROVIDER - NSDCACTIVITY_GEN_ALL_CORE
Walking - Indoors allowed/No heavy lifting/straining/Do not drive or operate machinery/Showering allowed/Do not make important decisions/Stairs allowed/Walking - Outdoors allowed

## 2019-04-08 NOTE — DISCHARGE NOTE NURSING/CASE MANAGEMENT/SOCIAL WORK - NSDCDPATPORTLINK_GEN_ALL_CORE
You can access the [facility] Patient Portal, offered by [facility] [place] [facility], by registering with the following website: [email or web address]

## 2019-04-08 NOTE — DISCHARGE NOTE PROVIDER - NSDCCPCAREPLAN_GEN_ALL_CORE_FT
PRINCIPAL DISCHARGE DIAGNOSIS  Diagnosis: Atrial fibrillation  Assessment and Plan of Treatment: Long term discontinuation of anticoagulation

## 2019-04-08 NOTE — PROGRESS NOTE ADULT - SUBJECTIVE AND OBJECTIVE BOX
Subjective:  " I am having a camera down my throat "   Pt presents from Adult home with aid at bedside   Reports no chest pain or sob     Medications:      PHYSICAL EXAM:  Vital Signs Last 24 Hrs  T(C): 36.4 (08 Apr 2019 09:33), Max: 36.4 (08 Apr 2019 09:33)  T(F): 97.5 (08 Apr 2019 09:33), Max: 97.5 (08 Apr 2019 09:33)  HR: 56 (08 Apr 2019 09:33) (56 - 56)  BP: 125/56 (08 Apr 2019 09:33) (125/56 - 125/56)  RR: 16 (08 Apr 2019 09:33) (16 - 16)  SpO2: 100% (08 Apr 2019 09:33) (100% - 100%)      General: A/ox 3, No acute Distress  Neck: Supple, NO JVD  Cardiac: S1 S2, No M/R/G  Pulmonary: CTAB, Breathing unlabored, No Rhonchi/Rales/Wheezing  Abdomen: Soft, Non -tender, +BS   Extremities: No Rashes, No edema  Neuro: A/o x 3, No focal deficits  Psch: normal mood , normal affect    LABS:

## 2019-04-08 NOTE — DISCHARGE NOTE PROVIDER - NSDCFUADDAPPT_GEN_ALL_CORE_FT
Follow up with Dr. Ivy in one to two weeks. Follow up with Dr. Ivy in 3 months or earlier if needed.

## 2019-04-08 NOTE — DISCHARGE NOTE PROVIDER - INSTRUCTIONS
Notify your doctor if you develop a fever, cough up blood, have any chest pain, palpitations or difficulty breathing. You may take a throat lozenge if you develop a sore throat or try warm salt water gargle. Resume your diet with soft foods first. Follow up with your cardiologist within 2 weeks for a follow up or sooner with any concerns. Report to the nearest ER with any emergencies.

## 2019-04-08 NOTE — PROGRESS NOTE ADULT - SUBJECTIVE AND OBJECTIVE BOX
Cardiology NP post procedure note:    -s/p HEIDI    TELE: NSR 60s    Allergies:  codeine (Unknown)      PAST MEDICAL & SURGICAL HISTORY:  Seizure disorder  Hypothyroid  PVC (premature ventricular contraction): s/p pvc ablation by Dr Barrientos 2/2016  Cardiomyopathy: recovered LV function  Atrial flutter: in setting of sepsis  CVA (cerebral vascular accident)  Pseudoaneurysm: right cerviacl carotid  Bipolar disorder  HTN (hypertension)  Mental retardation  Presence of Watchman left atrial appendage closure device: feb  15  2019  History of loop recorder: june 2018  [facility]  dr Ivy  H/O tracheostomy: successful removal  S/P tonsillectomy  Bilateral cataracts: s/p removal  H/O cardiac radiofrequency ablation: PVC-Dr Barrientos 2/2016      Vital Signs Last 24 Hrs  T(C): 36.4 (08 Apr 2019 09:33), Max: 36.4 (08 Apr 2019 09:33)  T(F): 97.5 (08 Apr 2019 09:33), Max: 97.5 (08 Apr 2019 09:33)  HR: 56 (08 Apr 2019 09:33) (56 - 56)  BP: 125/56 (08 Apr 2019 09:33) (125/56 - 125/56)  BP(mean): --  RR: 16 (08 Apr 2019 09:33) (16 - 16)  SpO2: 100% (08 Apr 2019 09:33) (100% - 100%)    Physical Exam:  Constitutional: NAD, oriented x 3, sleepy post sedation but easily arousable  Cardiovascular: +S1S2 RRR  Pulmonary: CTA b/l, unlabored  GI: soft NTND +BS  Extremities: no pedal edema, +distal pulses b/l  Neuro: non focal, BORGES x4      A/P: 63 year old gentleman with history of mental retardation, HTN, bipolar disease, seizure d/o (last seizure >10 years ago, right cervical carotid pseudoaneurysm, stroke and history of atrial flutter, PVCs s/p prior ablation and cardiomyopathy with recovered LV function. (CHADSVASc score=3). Due to his cognitive disability and frequent falls, he is not a good candidate for long-term anticoagulation.  He is, now, s/p successful percutaneous left atrial appendage occlusion with Watchman implant as an alternative to long-term anticoagulation   He presented today for HEIDI post watchman.  Awaiting official results but stable to discharge home after 3 hour recovery as per Dr. Anthony.  -Discharge home in 3 hours if remains stable with aide representative  -May discontinue Eliquis and start Plavix as per Dr. Ivy  -Continue all other meds as before  -Follow up with Dr. Ivy in one to two weeks Cardiology NP post procedure note:    -s/p HEIDI    TELE: NSR 60s    Allergies:  codeine (Unknown)      PAST MEDICAL & SURGICAL HISTORY:  Seizure disorder  Hypothyroid  PVC (premature ventricular contraction): s/p pvc ablation by Dr Barrientos 2/2016  Cardiomyopathy: recovered LV function  Atrial flutter: in setting of sepsis  CVA (cerebral vascular accident)  Pseudoaneurysm: right cerviacl carotid  Bipolar disorder  HTN (hypertension)  Mental retardation  Presence of Watchman left atrial appendage closure device: feb  15  2019  History of loop recorder: june 2018  [facility]  dr Ivy  H/O tracheostomy: successful removal  S/P tonsillectomy  Bilateral cataracts: s/p removal  H/O cardiac radiofrequency ablation: PVC-Dr Barrientos 2/2016      Vital Signs Last 24 Hrs  T(C): 36.4 (08 Apr 2019 09:33), Max: 36.4 (08 Apr 2019 09:33)  T(F): 97.5 (08 Apr 2019 09:33), Max: 97.5 (08 Apr 2019 09:33)  HR: 56 (08 Apr 2019 09:33) (56 - 56)  BP: 125/56 (08 Apr 2019 09:33) (125/56 - 125/56)  BP(mean): --  RR: 16 (08 Apr 2019 09:33) (16 - 16)  SpO2: 100% (08 Apr 2019 09:33) (100% - 100%)    Physical Exam:  Constitutional: NAD, oriented x 3, sleepy post sedation but easily arousable  Cardiovascular: +S1S2 RRR  Pulmonary: CTA b/l, unlabored  GI: soft NTND +BS  Extremities: no pedal edema, +distal pulses b/l  Neuro: non focal, BORGES x4      A/P: 63 year old gentleman with history of mental retardation, HTN, bipolar disease, seizure d/o (last seizure >10 years ago, right cervical carotid pseudoaneurysm, stroke and history of atrial flutter, PVCs s/p prior ablation and cardiomyopathy with recovered LV function. (CHADSVASc score=3). Due to his cognitive disability and frequent falls, he is not a good candidate for long-term anticoagulation.  He is, now, s/p successful percutaneous left atrial appendage occlusion with Watchman implant as an alternative to long-term anticoagulation   He presented today for HEIDI post watchman.  Awaiting official results but stable to discharge home after 3 hour recovery as per Dr. Anthony.  -Discharge home in 3 hours if remains stable with aide representative  -May discontinue Eliquis and start Plavix as per Dr. Ivy  -Continue all other meds as before  -Follow up with Dr. Ivy in 3 months or earlier if needed

## 2019-04-08 NOTE — PROGRESS NOTE ADULT - ASSESSMENT
Cardiology NP addendum:     -Patient with hypotensive episode (3 hours post procedure) at 130pm (58/35) treated with IVF bolus x 5 to equal up to a total of 2 liters fluid intake intraop and postop, trendelenburg postition, dopamine infusion at 5mcg/kg/min, and neosynephrine IV bolus by anesthiologist.  Urgent Chest xray was negative.  H/H sent stat and was stable compared to prior to procedure. Cardiology NP addendum:     -Patient with hypotensive episode (3 hours post procedure) at 130pm (58/35) treated with IVF bolus x 5 to equal up to a total of 2 liters fluid intake intraop and postop, trendelenburg postition, temporary dopamine infusion at 5mcg/kg/min, and neosynephrine IV bolus x 1 by anesthiologist.  Urgent Chest xray was negative.  H/H sent stat and was stable compared to prior to procedure.   < from: Xray Chest 1 View- PORTABLE-Urgent (04.08.19 @ 14:52) >  Findings:  The lungs are clear. The heart and mediastinum are unremarkable.  No   hilar abnormality is seen.  There is no evidence of pleural effusion. The   visualized osseous structures demonstrate degenerative changes in the   spine. A loop recorder is superimposed over the left chest.    Impression:    No evidence of acute cardiopulmonary disease.      < end of copied text >  Complete Blood Count (04.08.19 @ 13:44)    WBC Count: 4.4 K/uL    RBC Count: 3.82 M/uL    Hemoglobin: 12.0 g/dL    Hematocrit: 35.9 %    Mean Cell Volume: 94.0 fl    Mean Cell Hemoglobin: 31.4 pg    Mean Cell Hemoglobin Conc: 33.4 g/dL    Red Cell Distrib Width: 13.8 %    Platelet Count - Automated: 151 K/uL    -VSS (/70)    Patient tolerated PO intake and ambulation.  Stable for discharge to home 4 hours after episode with aide representative.  Instructed patient and aide to call 911 and/or go to nearest ER if not feeling well.  Discussed with Dr. Anthony and agrees

## 2019-04-08 NOTE — DISCHARGE NOTE PROVIDER - CARE PROVIDER_API CALL
rFancisco Ivy)  Cardiology; Internal Medicine  [address] [facility], [address] [place] [address]  Phone: [phone]  Fax: [phone]  Follow Up Time:

## 2019-04-08 NOTE — PROGRESS NOTE ADULT - ASSESSMENT
63 year old gentleman with history of mental retardation, HTN, bipolar disease, seizure d/o (last seizure >10 years ago, right cervical carotid pseudoaneurysm, stroke and history of atrial flutter, PVCs s/p prior ablation and cardiomyopathy with recovered LV function. (CHADSVASc score=3). Due to his cognitive disability and frequent falls, he is not a good candidate for long-term anticoagulation.  He is, now, s/p successful percutaneous left atrial appendage occlusion with Watchman implant as an alternative to long-term anticoagulation.      He presents today for HEIDI post watchman   he is currently stable and appears comfortable     PRE-PROCEDURE ASSESSMENT  HEIDI   -Patient seen and examined  -Labs reviewed  - Aide at bedside

## 2019-04-08 NOTE — DISCHARGE NOTE PROVIDER - HOSPITAL COURSE
63 year old gentleman with history of mental retardation, HTN, bipolar disease, seizure d/o (last seizure >10 years ago, right cervical carotid pseudoaneurysm, stroke and history of atrial flutter, PVCs s/p prior ablation and cardiomyopathy with recovered LV function. (CHADSVASc score=3). Due to his cognitive disability and frequent falls, he is not a good candidate for long-term anticoagulation.  He is, now, s/p successful percutaneous left atrial appendage occlusion with Watchman implant as an alternative to long-term anticoagulation   He presented today for HEIDI post watchman.  Awaiting official results but stable to discharge home after 3 hour recovery as per Dr. Anthony.    -Discharge home in 3 hours if remains stable with aide representative    -May discontinue Eliquis and start Plavix as per Dr. Ivy    -Continue all other meds as before    -Follow up with Dr. Ivy in one to two weeks 63 year old gentleman with history of mental retardation, HTN, bipolar disease, seizure d/o (last seizure >10 years ago, right cervical carotid pseudoaneurysm, stroke and history of atrial flutter, PVCs s/p prior ablation and cardiomyopathy with recovered LV function. (CHADSVASc score=3). Due to his cognitive disability and frequent falls, he is not a good candidate for long-term anticoagulation.  He is, now, s/p successful percutaneous left atrial appendage occlusion with Watchman implant as an alternative to long-term anticoagulation   He presented today for HEIDI post watchman.  Awaiting official results but stable to discharge home after 3 hour recovery as per Dr. Anthony.    -Discharge home in 3 hours if remains stable with aide representative    -May discontinue Eliquis and start Plavix as per Dr. Ivy    -Continue all other meds as before    -Follow up with Dr. Ivy in 3 months or earlier if needed

## 2019-04-26 ENCOUNTER — APPOINTMENT (OUTPATIENT)
Dept: ELECTROPHYSIOLOGY | Facility: CLINIC | Age: 64
End: 2019-04-26
Payer: MEDICARE

## 2019-04-26 PROCEDURE — 93299: CPT

## 2019-04-26 PROCEDURE — 93298 REM INTERROG DEV EVAL SCRMS: CPT

## 2019-05-31 ENCOUNTER — APPOINTMENT (OUTPATIENT)
Dept: ELECTROPHYSIOLOGY | Facility: CLINIC | Age: 64
End: 2019-05-31
Payer: MEDICARE

## 2019-05-31 PROCEDURE — 93299: CPT

## 2019-05-31 PROCEDURE — 93298 REM INTERROG DEV EVAL SCRMS: CPT

## 2019-07-01 ENCOUNTER — APPOINTMENT (OUTPATIENT)
Dept: ELECTROPHYSIOLOGY | Facility: CLINIC | Age: 64
End: 2019-07-01
Payer: MEDICARE

## 2019-07-01 PROCEDURE — 93298 REM INTERROG DEV EVAL SCRMS: CPT

## 2019-07-01 PROCEDURE — 93299: CPT

## 2019-07-16 ENCOUNTER — OTHER (OUTPATIENT)
Age: 64
End: 2019-07-16

## 2019-08-02 ENCOUNTER — APPOINTMENT (OUTPATIENT)
Dept: ELECTROPHYSIOLOGY | Facility: CLINIC | Age: 64
End: 2019-08-02
Payer: MEDICARE

## 2019-08-02 PROCEDURE — 93299: CPT

## 2019-08-02 PROCEDURE — 93298 REM INTERROG DEV EVAL SCRMS: CPT

## 2019-08-07 ENCOUNTER — NON-APPOINTMENT (OUTPATIENT)
Age: 64
End: 2019-08-07

## 2019-08-07 ENCOUNTER — APPOINTMENT (OUTPATIENT)
Dept: ELECTROPHYSIOLOGY | Facility: CLINIC | Age: 64
End: 2019-08-07
Payer: MEDICARE

## 2019-08-07 VITALS — DIASTOLIC BLOOD PRESSURE: 60 MMHG | SYSTOLIC BLOOD PRESSURE: 108 MMHG

## 2019-08-07 DIAGNOSIS — R00.1 BRADYCARDIA, UNSPECIFIED: ICD-10-CM

## 2019-08-07 PROCEDURE — 93000 ELECTROCARDIOGRAM COMPLETE: CPT

## 2019-08-07 PROCEDURE — 99214 OFFICE O/P EST MOD 30 MIN: CPT | Mod: 25

## 2019-08-07 NOTE — HISTORY OF PRESENT ILLNESS
[FreeTextEntry1] : 63 year old gentleman with history of mental retardation, HTN, right cervical carotid pseudoaneurysm, prior stroke, atrial flutter and PAF on ILR (CHADSVASc score of 3 - HTN, prior stroke), with recurrent falls, unstable gait, and cognitive dysfunction, considered at high risk for long-term anticoagulation s/p successful LAAO with WATCHMAN implant.  Remained on short term Eliquis post WATCHMAN. HEIDI > 45 days post implant showed stable device placement without significant leak.  Eliquis was d/c and started on Plavix and ASA.  Subsequently presented to [facility] with GIB found to have esophageal tear.  Required blood transfusions. Was hospitalized for several weeks weeks prior to discharge to rehab facility for 4-5 weeks.  During this period, had increased frequency of PAF with RVR. Metoprolol was increased from 25mg to 100mg BID .\par \par Presents today for post procedure and EP follow-up. Complaints of fatigue but otherwise asymptomatic. Tolerating ASA and Plavix without bleeding complications.  No recurrent AF on ILR for the past 3 months. \par

## 2019-08-07 NOTE — DISCUSSION/SUMMARY
[FreeTextEntry1] : 63 year old gentleman with history of mental retardation, HTN, right cervical carotid pseudoaneurysm, prior stroke, atrial flutter and PAF on ILR (CHADSVASc score of 3 - HTN, prior stroke), with recurrent falls, unstable gait, and cognitive dysfunction, considered at high risk for long-term anticoagulation s/p successful LAAO with WATCHMAN implant.  Remained on short term Eliquis post WATCHMAN w/o bleeding complications. HEIDI > 45 days post implant showed stable device placement without significant leak.  Eliquis was d/c and started on Plavix and ASA. \par Post procedure course c/b hospitalization at [facility] for esophageal tear requiring blood transfusions. Was hospitalized for several weeks weeks prior to discharge to rehab facility.  During this period, had increased frequency of PAF with RVR. Metoprolol was increased from 25mg to 100mg BID. \par Arrhythmia burden now significantly reduced with no recurrent AF for the past 3 months. \par \par Presents today for post procedure and EP follow-up.  Reports of fatigue but otherwise asymptomatic. Tolerating ASA and Plavix without bleeding complications.  Sinus Bradycardia on EKG with average HR 50bpm on ILR.\par \par - Continue ASA/ Plavix for 6 months post HEIDI. D/c Plavix 10/2019 and continue long term ASA 81mg\par - Plan for cardiac CT at 1 year post LAAO to assess WATCHMAN placement\par - Decreased BB to Torprol XL 50mg in the setting of bradycardia and fatigue. \par - Continue to monitor ILR closely\par \par D/w Dr. Ivy.\par EP f/up in 2-3 months unless sooner is needed

## 2019-08-07 NOTE — REVIEW OF SYSTEMS
[Feeling Fatigued] : feeling fatigued [Negative] : Heme/Lymph [Fever] : no fever [Chills] : no chills [Shortness Of Breath] : no shortness of breath [Dyspnea on exertion] : not dyspnea during exertion [Lower Ext Edema] : no extremity edema [Chest Pain] : no chest pain [Palpitations] : no palpitations

## 2019-09-06 ENCOUNTER — APPOINTMENT (OUTPATIENT)
Dept: ELECTROPHYSIOLOGY | Facility: CLINIC | Age: 64
End: 2019-09-06
Payer: MEDICARE

## 2019-09-06 PROCEDURE — 93299: CPT

## 2019-09-06 PROCEDURE — 93298 REM INTERROG DEV EVAL SCRMS: CPT

## 2019-10-07 ENCOUNTER — APPOINTMENT (OUTPATIENT)
Dept: ELECTROPHYSIOLOGY | Facility: CLINIC | Age: 64
End: 2019-10-07

## 2019-10-10 ENCOUNTER — APPOINTMENT (OUTPATIENT)
Dept: ELECTROPHYSIOLOGY | Facility: CLINIC | Age: 64
End: 2019-10-10
Payer: MEDICARE

## 2019-10-10 VITALS
HEART RATE: 57 BPM | SYSTOLIC BLOOD PRESSURE: 110 MMHG | HEIGHT: 67 IN | BODY MASS INDEX: 23.54 KG/M2 | DIASTOLIC BLOOD PRESSURE: 66 MMHG | WEIGHT: 150 LBS | OXYGEN SATURATION: 93 %

## 2019-10-10 DIAGNOSIS — Z95.818 PRESENCE OF OTHER CARDIAC IMPLANTS AND GRAFTS: ICD-10-CM

## 2019-10-10 DIAGNOSIS — I48.0 PAROXYSMAL ATRIAL FIBRILLATION: ICD-10-CM

## 2019-10-10 PROCEDURE — 99214 OFFICE O/P EST MOD 30 MIN: CPT | Mod: 25

## 2019-10-10 PROCEDURE — 93000 ELECTROCARDIOGRAM COMPLETE: CPT

## 2019-10-10 RX ORDER — ASPIRIN 325 MG/1
325 TABLET ORAL
Qty: 90 | Refills: 0 | Status: ACTIVE | COMMUNITY
Start: 2019-10-10 | End: 1900-01-01

## 2019-10-10 RX ORDER — OMEGA-3/DHA/EPA/FISH OIL 300-1000MG
400 CAPSULE ORAL
Qty: 90 | Refills: 2 | Status: ACTIVE | COMMUNITY
Start: 2019-10-10

## 2019-10-10 RX ORDER — APIXABAN 5 MG/1
5 TABLET, FILM COATED ORAL
Qty: 60 | Refills: 3 | Status: DISCONTINUED | COMMUNITY
Start: 2019-03-14 | End: 2019-10-10

## 2019-10-10 NOTE — PHYSICAL EXAM
[General Appearance - Well Developed] : well developed [Normal Appearance] : normal appearance [General Appearance - Well Nourished] : well nourished [Normal Conjunctiva] : the conjunctiva exhibited no abnormalities [Exaggerated Use Of Accessory Muscles For Inspiration] : no accessory muscle use [Respiration, Rhythm And Depth] : normal respiratory rhythm and effort [Auscultation Breath Sounds / Voice Sounds] : lungs were clear to auscultation bilaterally [Heart Rate And Rhythm] : heart rate and rhythm were normal [Heart Sounds] : normal S1 and S2 [Murmurs] : no murmurs present [Edema] : no peripheral edema present [Bowel Sounds] : normal bowel sounds [Abdomen Soft] : soft [Abnormal Walk] : normal gait [Skin Color & Pigmentation] : normal skin color and pigmentation [] : no rash [No Anxiety] : not feeling anxious

## 2019-11-08 ENCOUNTER — APPOINTMENT (OUTPATIENT)
Dept: ELECTROPHYSIOLOGY | Facility: CLINIC | Age: 64
End: 2019-11-08
Payer: MEDICARE

## 2019-11-08 PROCEDURE — 93299: CPT

## 2019-11-08 PROCEDURE — 93298 REM INTERROG DEV EVAL SCRMS: CPT

## 2019-12-06 ENCOUNTER — MEDICATION RENEWAL (OUTPATIENT)
Age: 64
End: 2019-12-06

## 2019-12-06 RX ORDER — METOPROLOL SUCCINATE 50 MG/1
50 TABLET, EXTENDED RELEASE ORAL DAILY
Qty: 30 | Refills: 3 | Status: ACTIVE | COMMUNITY
Start: 2018-01-22 | End: 1900-01-01

## 2019-12-09 ENCOUNTER — APPOINTMENT (OUTPATIENT)
Dept: ELECTROPHYSIOLOGY | Facility: CLINIC | Age: 64
End: 2019-12-09
Payer: MEDICARE

## 2019-12-09 PROCEDURE — 93299: CPT

## 2019-12-09 PROCEDURE — 93298 REM INTERROG DEV EVAL SCRMS: CPT

## 2020-01-08 DIAGNOSIS — Z95.818 PRESENCE OF OTHER CARDIAC IMPLANTS AND GRAFTS: ICD-10-CM

## 2020-01-10 ENCOUNTER — APPOINTMENT (OUTPATIENT)
Dept: ELECTROPHYSIOLOGY | Facility: CLINIC | Age: 65
End: 2020-01-10
Payer: MEDICARE

## 2020-01-10 PROCEDURE — G2066: CPT

## 2020-01-10 PROCEDURE — 93298 REM INTERROG DEV EVAL SCRMS: CPT

## 2020-01-22 ENCOUNTER — TRANSCRIPTION ENCOUNTER (OUTPATIENT)
Age: 65
End: 2020-01-22

## 2020-02-08 NOTE — H&P PST ADULT - HEMATOLOGY/LYMPHATICS
Patient Education        Sore Throat in Children: Care Instructions  Your Care Instructions  Infection by bacteria or a virus causes most sore throats. Cigarette smoke, dry air, air pollution, allergies, or yelling also can cause a sore throat. Sore throats can be painful and annoying. Fortunately, most sore throats go away on their own. Home treatment may help your child feel better sooner. Antibiotics are not needed unless your child has a strep infection. Follow-up care is a key part of your child's treatment and safety. Be sure to make and go to all appointments, and call your doctor if your child is having problems. It's also a good idea to know your child's test results and keep a list of the medicines your child takes. How can you care for your child at home? · If the doctor prescribed antibiotics for your child, give them as directed. Do not stop using them just because your child feels better. Your child needs to take the full course of antibiotics. · If your child is old enough to do so, have him or her gargle with warm salt water at least once each hour to help reduce swelling and relieve discomfort. Use 1 teaspoon of salt mixed in 8 ounces of warm water. Most children can gargle when they are 10to 6years old. · Give acetaminophen (Tylenol) or ibuprofen (Advil, Motrin) for pain. Read and follow all instructions on the label. Do not give aspirin to anyone younger than 20. It has been linked to Reye syndrome, a serious illness. · Try an over-the-counter anesthetic throat spray or throat lozenges, which may help relieve throat pain. Do not give lozenges to children younger than age 3. If your child is younger than age 3, ask your doctor if you can give your child numbing medicines. · Have your child drink plenty of fluids, enough so that his or her urine is light yellow or clear like water. Drinks such as warm water or warm lemonade may ease throat pain.  Frozen ice treats, ice cream, scrambled eggs, gelatin dessert, and sherbet can also soothe the throat. If your child has kidney, heart, or liver disease and has to limit fluids, talk with your doctor before you increase the amount of fluids your child drinks. · Keep your child away from smoke. Do not smoke or let anyone else smoke around your child or in your house. Smoke irritates the throat. · Place a humidifier by your child's bed or close to your child. This may make it easier for your child to breathe. Follow the directions for cleaning the machine. When should you call for help? Call 911 anytime you think your child may need emergency care. For example, call if:    · Your child is confused, does not know where he or she is, or is extremely sleepy or hard to wake up.    Call your doctor now or seek immediate medical care if:    · Your child has a new or higher fever.     · Your child has a fever with a stiff neck or a severe headache.     · Your child has any trouble breathing.     · Your child cannot swallow or cannot drink enough because of throat pain.     · Your child coughs up discolored or bloody mucus.    Watch closely for changes in your child's health, and be sure to contact your doctor if:    · Your child has any new symptoms, such as a rash, an earache, vomiting, or nausea.     · Your child is not getting better as expected. Where can you learn more? Go to http://woo-morgan.info/. Enter E325 in the search box to learn more about \"Sore Throat in Children: Care Instructions. \"  Current as of: October 21, 2018  Content Version: 12.2  © 0227-7261 [employer]. Care instructions adapted under license by [employer] (which disclaims liability or warranty for this information). If you have questions about a medical condition or this instruction, always ask your healthcare professional. [address] any warranty or liability for your use of this information. not applicable

## 2020-02-10 ENCOUNTER — APPOINTMENT (OUTPATIENT)
Dept: ELECTROPHYSIOLOGY | Facility: CLINIC | Age: 65
End: 2020-02-10
Payer: MEDICARE

## 2020-02-10 PROCEDURE — 93298 REM INTERROG DEV EVAL SCRMS: CPT

## 2020-02-10 PROCEDURE — G2066: CPT

## 2020-02-18 ENCOUNTER — TRANSCRIPTION ENCOUNTER (OUTPATIENT)
Age: 65
End: 2020-02-18

## 2020-03-13 ENCOUNTER — APPOINTMENT (OUTPATIENT)
Dept: ELECTROPHYSIOLOGY | Facility: CLINIC | Age: 65
End: 2020-03-13
Payer: MEDICARE

## 2020-03-13 PROCEDURE — 93298 REM INTERROG DEV EVAL SCRMS: CPT

## 2020-03-13 PROCEDURE — G2066: CPT

## 2020-06-05 NOTE — ASU PATIENT PROFILE, ADULT - CAREGIVER
Yes No Residual Tumor Seen Histology Text: There were no malignant cells seen in the sections examined.

## 2020-07-20 NOTE — ASU PATIENT PROFILE, ADULT - ANESTHESIA, PREVIOUS REACTION, PROFILE
none Dupixent Counseling: I discussed with the patient the risks of dupilumab including but not limited to eye infection and irritation, cold sores, injection site reactions, worsening of asthma, allergic reactions and increased risk of parasitic infection.  Live vaccines should be avoided while taking dupilumab. Dupilumab will also interact with certain medications such as warfarin and cyclosporine. The patient understands that monitoring is required and they must alert us or the primary physician if symptoms of infection or other concerning signs are noted.

## 2020-10-09 ENCOUNTER — INPATIENT (INPATIENT)
Facility: HOSPITAL | Age: 65
LOS: 5 days | Discharge: HOME | DRG: 698 | End: 2020-10-15
Attending: FAMILY MEDICINE | Admitting: STUDENT IN AN ORGANIZED HEALTH CARE EDUCATION/TRAINING PROGRAM
Payer: MEDICARE

## 2020-10-09 VITALS
RESPIRATION RATE: 20 BRPM | OXYGEN SATURATION: 99 % | HEART RATE: 79 BPM | TEMPERATURE: 102 F | HEIGHT: 66 IN | DIASTOLIC BLOOD PRESSURE: 58 MMHG | SYSTOLIC BLOOD PRESSURE: 112 MMHG

## 2020-10-09 DIAGNOSIS — Z90.89 ACQUIRED ABSENCE OF OTHER ORGANS: Chronic | ICD-10-CM

## 2020-10-09 DIAGNOSIS — H26.9 UNSPECIFIED CATARACT: Chronic | ICD-10-CM

## 2020-10-09 DIAGNOSIS — Z93.1 GASTROSTOMY STATUS: Chronic | ICD-10-CM

## 2020-10-09 DIAGNOSIS — Z98.890 OTHER SPECIFIED POSTPROCEDURAL STATES: Chronic | ICD-10-CM

## 2020-10-09 DIAGNOSIS — Z95.818 PRESENCE OF OTHER CARDIAC IMPLANTS AND GRAFTS: Chronic | ICD-10-CM

## 2020-10-09 DIAGNOSIS — R50.9 FEVER, UNSPECIFIED: ICD-10-CM

## 2020-10-09 LAB
ALBUMIN SERPL ELPH-MCNC: 3.9 G/DL — SIGNIFICANT CHANGE UP (ref 3.3–5.2)
ALP SERPL-CCNC: 83 U/L — SIGNIFICANT CHANGE UP (ref 40–120)
ALT FLD-CCNC: 19 U/L — SIGNIFICANT CHANGE UP
ANION GAP SERPL CALC-SCNC: 13 MMOL/L — SIGNIFICANT CHANGE UP (ref 5–17)
ANISOCYTOSIS BLD QL: SLIGHT — SIGNIFICANT CHANGE UP
APPEARANCE UR: ABNORMAL
AST SERPL-CCNC: 26 U/L — SIGNIFICANT CHANGE UP
BACTERIA # UR AUTO: ABNORMAL
BASE EXCESS BLDV CALC-SCNC: 3.4 MMOL/L — HIGH (ref -2–2)
BASOPHILS # BLD AUTO: 0 K/UL — SIGNIFICANT CHANGE UP (ref 0–0.2)
BASOPHILS NFR BLD AUTO: 0 % — SIGNIFICANT CHANGE UP (ref 0–2)
BILIRUB SERPL-MCNC: 0.4 MG/DL — SIGNIFICANT CHANGE UP (ref 0.4–2)
BILIRUB UR-MCNC: NEGATIVE — SIGNIFICANT CHANGE UP
BUN SERPL-MCNC: 18 MG/DL — SIGNIFICANT CHANGE UP (ref 8–20)
CA-I SERPL-SCNC: 1.16 MMOL/L — SIGNIFICANT CHANGE UP (ref 1.15–1.33)
CALCIUM SERPL-MCNC: 9.5 MG/DL — SIGNIFICANT CHANGE UP (ref 8.6–10.2)
CHLORIDE BLDV-SCNC: 93 MMOL/L — LOW (ref 98–107)
CHLORIDE SERPL-SCNC: 90 MMOL/L — LOW (ref 98–107)
CO2 SERPL-SCNC: 25 MMOL/L — SIGNIFICANT CHANGE UP (ref 22–29)
COLOR SPEC: YELLOW — SIGNIFICANT CHANGE UP
CREAT SERPL-MCNC: 0.52 MG/DL — SIGNIFICANT CHANGE UP (ref 0.5–1.3)
CRP SERPL-MCNC: 16.03 MG/DL — HIGH (ref 0–0.4)
D DIMER BLD IA.RAPID-MCNC: 996 NG/ML DDU — HIGH
DIFF PNL FLD: ABNORMAL
ELLIPTOCYTES BLD QL SMEAR: SLIGHT — SIGNIFICANT CHANGE UP
EOSINOPHIL # BLD AUTO: 0 K/UL — SIGNIFICANT CHANGE UP (ref 0–0.5)
EOSINOPHIL NFR BLD AUTO: 0 % — SIGNIFICANT CHANGE UP (ref 0–6)
FERRITIN SERPL-MCNC: 238 NG/ML — SIGNIFICANT CHANGE UP (ref 30–400)
GAS PNL BLDV: 132 MMOL/L — LOW (ref 135–145)
GAS PNL BLDV: SIGNIFICANT CHANGE UP
GAS PNL BLDV: SIGNIFICANT CHANGE UP
GLUCOSE BLDV-MCNC: 97 MG/DL — SIGNIFICANT CHANGE UP (ref 70–99)
GLUCOSE SERPL-MCNC: 95 MG/DL — SIGNIFICANT CHANGE UP (ref 70–99)
GLUCOSE UR QL: NEGATIVE — SIGNIFICANT CHANGE UP
HCO3 BLDV-SCNC: 27 MMOL/L — SIGNIFICANT CHANGE UP (ref 21–29)
HCT VFR BLD CALC: 31.7 % — LOW (ref 39–50)
HCT VFR BLDA CALC: 36 — LOW (ref 39–50)
HGB BLD CALC-MCNC: 11.7 G/DL — LOW (ref 13–17)
HGB BLD-MCNC: 10.4 G/DL — LOW (ref 13–17)
KETONES UR-MCNC: NEGATIVE — SIGNIFICANT CHANGE UP
LACTATE BLDV-MCNC: 1.5 MMOL/L — SIGNIFICANT CHANGE UP (ref 0.5–2)
LEUKOCYTE ESTERASE UR-ACNC: ABNORMAL
LYMPHOCYTES # BLD AUTO: 1.13 K/UL — SIGNIFICANT CHANGE UP (ref 1–3.3)
LYMPHOCYTES # BLD AUTO: 3.5 % — LOW (ref 13–44)
MACROCYTES BLD QL: SLIGHT — SIGNIFICANT CHANGE UP
MANUAL SMEAR VERIFICATION: SIGNIFICANT CHANGE UP
MCHC RBC-ENTMCNC: 31.4 PG — SIGNIFICANT CHANGE UP (ref 27–34)
MCHC RBC-ENTMCNC: 32.8 GM/DL — SIGNIFICANT CHANGE UP (ref 32–36)
MCV RBC AUTO: 95.8 FL — SIGNIFICANT CHANGE UP (ref 80–100)
MICROCYTES BLD QL: SLIGHT — SIGNIFICANT CHANGE UP
MONOCYTES # BLD AUTO: 3.35 K/UL — HIGH (ref 0–0.9)
MONOCYTES NFR BLD AUTO: 10.4 % — SIGNIFICANT CHANGE UP (ref 2–14)
NEUTROPHILS # BLD AUTO: 27.75 K/UL — HIGH (ref 1.8–7.4)
NEUTROPHILS NFR BLD AUTO: 74.8 % — SIGNIFICANT CHANGE UP (ref 43–77)
NEUTS BAND # BLD: 11.3 % — HIGH (ref 0–8)
NITRITE UR-MCNC: POSITIVE
OTHER CELLS CSF MANUAL: 9 ML/DL — LOW (ref 18–22)
PCO2 BLDV: 45 MMHG — SIGNIFICANT CHANGE UP (ref 35–50)
PH BLDV: 7.41 — SIGNIFICANT CHANGE UP (ref 7.32–7.43)
PH UR: 9 — HIGH (ref 5–8)
PLAT MORPH BLD: NORMAL — SIGNIFICANT CHANGE UP
PLATELET # BLD AUTO: 312 K/UL — SIGNIFICANT CHANGE UP (ref 150–400)
PO2 BLDV: 32 MMHG — SIGNIFICANT CHANGE UP (ref 25–45)
POTASSIUM BLDV-SCNC: 3.8 MMOL/L — SIGNIFICANT CHANGE UP (ref 3.4–4.5)
POTASSIUM SERPL-MCNC: 3.9 MMOL/L — SIGNIFICANT CHANGE UP (ref 3.5–5.3)
POTASSIUM SERPL-SCNC: 3.9 MMOL/L — SIGNIFICANT CHANGE UP (ref 3.5–5.3)
PROCALCITONIN SERPL-MCNC: 0.44 NG/ML — HIGH (ref 0.02–0.1)
PROT SERPL-MCNC: 8.3 G/DL — SIGNIFICANT CHANGE UP (ref 6.6–8.7)
PROT UR-MCNC: 30 MG/DL
RAPID RVP RESULT: SIGNIFICANT CHANGE UP
RBC # BLD: 3.31 M/UL — LOW (ref 4.2–5.8)
RBC # FLD: 15.6 % — HIGH (ref 10.3–14.5)
RBC BLD AUTO: ABNORMAL
RBC CASTS # UR COMP ASSIST: ABNORMAL /HPF (ref 0–4)
SAO2 % BLDV: 57 % — SIGNIFICANT CHANGE UP
SARS-COV-2 RNA SPEC QL NAA+PROBE: SIGNIFICANT CHANGE UP
SODIUM SERPL-SCNC: 128 MMOL/L — LOW (ref 135–145)
SP GR SPEC: 1.01 — SIGNIFICANT CHANGE UP (ref 1.01–1.02)
TRI-PHOS CRY UR QL COMP ASSIST: ABNORMAL
UROBILINOGEN FLD QL: NEGATIVE — SIGNIFICANT CHANGE UP
WBC # BLD: 32.23 K/UL — HIGH (ref 3.8–10.5)
WBC # FLD AUTO: 32.23 K/UL — HIGH (ref 3.8–10.5)
WBC UR QL: ABNORMAL

## 2020-10-09 PROCEDURE — 99285 EMERGENCY DEPT VISIT HI MDM: CPT | Mod: CS

## 2020-10-09 PROCEDURE — 71045 X-RAY EXAM CHEST 1 VIEW: CPT | Mod: 26

## 2020-10-09 PROCEDURE — 93010 ELECTROCARDIOGRAM REPORT: CPT

## 2020-10-09 PROCEDURE — 99223 1ST HOSP IP/OBS HIGH 75: CPT

## 2020-10-09 PROCEDURE — 71275 CT ANGIOGRAPHY CHEST: CPT | Mod: 26

## 2020-10-09 RX ORDER — VALPROIC ACID (AS SODIUM SALT) 250 MG/5ML
250 SOLUTION, ORAL ORAL THREE TIMES A DAY
Refills: 0 | Status: DISCONTINUED | OUTPATIENT
Start: 2020-10-09 | End: 2020-10-10

## 2020-10-09 RX ORDER — SODIUM CHLORIDE 9 MG/ML
1000 INJECTION, SOLUTION INTRAVENOUS
Refills: 0 | Status: DISCONTINUED | OUTPATIENT
Start: 2020-10-09 | End: 2020-10-10

## 2020-10-09 RX ORDER — SERTRALINE 25 MG/1
100 TABLET, FILM COATED ORAL DAILY
Refills: 0 | Status: DISCONTINUED | OUTPATIENT
Start: 2020-10-09 | End: 2020-10-15

## 2020-10-09 RX ORDER — PIPERACILLIN AND TAZOBACTAM 4; .5 G/20ML; G/20ML
3.38 INJECTION, POWDER, LYOPHILIZED, FOR SOLUTION INTRAVENOUS EVERY 8 HOURS
Refills: 0 | Status: DISCONTINUED | OUTPATIENT
Start: 2020-10-09 | End: 2020-10-12

## 2020-10-09 RX ORDER — LORATADINE 10 MG/1
10 TABLET ORAL DAILY
Refills: 0 | Status: DISCONTINUED | OUTPATIENT
Start: 2020-10-09 | End: 2020-10-15

## 2020-10-09 RX ORDER — METOPROLOL TARTRATE 50 MG
25 TABLET ORAL DAILY
Refills: 0 | Status: DISCONTINUED | OUTPATIENT
Start: 2020-10-09 | End: 2020-10-10

## 2020-10-09 RX ORDER — HEPARIN SODIUM 5000 [USP'U]/ML
5000 INJECTION INTRAVENOUS; SUBCUTANEOUS EVERY 12 HOURS
Refills: 0 | Status: DISCONTINUED | OUTPATIENT
Start: 2020-10-09 | End: 2020-10-15

## 2020-10-09 RX ORDER — CLOPIDOGREL BISULFATE 75 MG/1
75 TABLET, FILM COATED ORAL DAILY
Refills: 0 | Status: DISCONTINUED | OUTPATIENT
Start: 2020-10-09 | End: 2020-10-15

## 2020-10-09 RX ORDER — PANTOPRAZOLE SODIUM 20 MG/1
40 TABLET, DELAYED RELEASE ORAL
Refills: 0 | Status: DISCONTINUED | OUTPATIENT
Start: 2020-10-09 | End: 2020-10-10

## 2020-10-09 RX ORDER — ACETAMINOPHEN 500 MG
650 TABLET ORAL ONCE
Refills: 0 | Status: COMPLETED | OUTPATIENT
Start: 2020-10-09 | End: 2020-10-09

## 2020-10-09 RX ORDER — LEVOTHYROXINE SODIUM 125 MCG
112 TABLET ORAL DAILY
Refills: 0 | Status: DISCONTINUED | OUTPATIENT
Start: 2020-10-09 | End: 2020-10-10

## 2020-10-09 RX ORDER — ASPIRIN/CALCIUM CARB/MAGNESIUM 324 MG
81 TABLET ORAL DAILY
Refills: 0 | Status: DISCONTINUED | OUTPATIENT
Start: 2020-10-09 | End: 2020-10-15

## 2020-10-09 RX ORDER — ASCORBIC ACID 60 MG
500 TABLET,CHEWABLE ORAL DAILY
Refills: 0 | Status: DISCONTINUED | OUTPATIENT
Start: 2020-10-09 | End: 2020-10-15

## 2020-10-09 RX ORDER — PHENOBARBITAL 60 MG
64.8 TABLET ORAL
Refills: 0 | Status: DISCONTINUED | OUTPATIENT
Start: 2020-10-09 | End: 2020-10-15

## 2020-10-09 RX ORDER — SODIUM CHLORIDE 9 MG/ML
1000 INJECTION INTRAMUSCULAR; INTRAVENOUS; SUBCUTANEOUS ONCE
Refills: 0 | Status: COMPLETED | OUTPATIENT
Start: 2020-10-09 | End: 2020-10-09

## 2020-10-09 RX ORDER — CEFTRIAXONE 500 MG/1
1000 INJECTION, POWDER, FOR SOLUTION INTRAMUSCULAR; INTRAVENOUS ONCE
Refills: 0 | Status: COMPLETED | OUTPATIENT
Start: 2020-10-09 | End: 2020-10-09

## 2020-10-09 RX ADMIN — Medication 125 MILLIGRAM(S): at 21:58

## 2020-10-09 RX ADMIN — CEFTRIAXONE 100 MILLIGRAM(S): 500 INJECTION, POWDER, FOR SOLUTION INTRAMUSCULAR; INTRAVENOUS at 15:22

## 2020-10-09 RX ADMIN — Medication 250 MILLIGRAM(S): at 21:57

## 2020-10-09 RX ADMIN — SODIUM CHLORIDE 75 MILLILITER(S): 9 INJECTION, SOLUTION INTRAVENOUS at 18:00

## 2020-10-09 RX ADMIN — SODIUM CHLORIDE 1000 MILLILITER(S): 9 INJECTION INTRAMUSCULAR; INTRAVENOUS; SUBCUTANEOUS at 16:56

## 2020-10-09 RX ADMIN — Medication 650 MILLIGRAM(S): at 16:56

## 2020-10-09 RX ADMIN — Medication 64.8 MILLIGRAM(S): at 21:57

## 2020-10-09 RX ADMIN — CEFTRIAXONE 1000 MILLIGRAM(S): 500 INJECTION, POWDER, FOR SOLUTION INTRAMUSCULAR; INTRAVENOUS at 16:56

## 2020-10-09 RX ADMIN — PIPERACILLIN AND TAZOBACTAM 25 GRAM(S): 4; .5 INJECTION, POWDER, LYOPHILIZED, FOR SOLUTION INTRAVENOUS at 21:57

## 2020-10-09 RX ADMIN — SODIUM CHLORIDE 1000 MILLILITER(S): 9 INJECTION INTRAMUSCULAR; INTRAVENOUS; SUBCUTANEOUS at 15:22

## 2020-10-09 RX ADMIN — Medication 650 MILLIGRAM(S): at 15:21

## 2020-10-09 NOTE — ED PROVIDER NOTE - CLINICAL SUMMARY MEDICAL DECISION MAKING FREE TEXT BOX
fever of unknown origin. febrile to 101 here orally. with history of requiring O2, concern for COVID19 vs pna. labs, cultures, abx. admit

## 2020-10-09 NOTE — H&P ADULT - NSHPREVIEWOFSYSTEMS_GEN_ALL_CORE
64 y/o M w/ a PMH of MR, HTN, bipolar disease, seizure disorder was BIBA from [facility] after being found to have a fever x 1 day.  Pt is a poor historian and information was obtained from chart.  Facility also reported pt was hypoxic to 82%.  Of note, pt has a chronic buckley. unable to obtain due to mentation.

## 2020-10-09 NOTE — ED PROVIDER NOTE - OBJECTIVE STATEMENT
65 yom PMH mental retardation, HTN, bipolar disease, seizure disease here from [facility] for fever today. Patient unable to provide history but from nursing home paperwork fever starting this morning along with hypoxia to 82% and started on oxygen NC 3L. With buckley and peg tube in place

## 2020-10-09 NOTE — H&P ADULT - ASSESSMENT
64 y/o M w/ a PMH of MR, HTN, bipolar disease, seizure disorder was BIBA from [facility] after being found to have a fever x 1 day.  Pt is a poor historian and information was obtained from chart.  Facility also reported pt was hypoxic to 82%.      On arrival pt was saturating in the 80's and imprved w/  oxygen NC 3L.  Garcia present on admission.  Ddimer elevated.  CTA and COVID pending.  CXR negative.  UA +  Pt being admitted for sepsis 2/2 UTI and acute hypoxic resp failure. 66 y/o M w/ a PMH of MR, HTN, bipolar disease, seizure disorder was BIBA from [facility] after being found to be febrile to 102.4 and saturating 82% ORA.  On arrival pt was saturating >94% on 3L NC, he was tachypnic but remainder of VS were WNL.  Pt was noted to be lethargic.  Labs were significant for leukocytosis w/ bandemia.  UA was consistent w/ UTI. Garcia was present on admission and changed in ED.  Pt was admitted for sepsis likely 2/2 UTI and acute hypoxic respiratory failure.      Sepsis likely 2/2 UTI  - Garcia present on admission  - UA + for UTI.    - Leukocytosis w/ bandemia, procal 0.44, CRP 16   - Trend CBC w/ diff daily   - IVF w/ LR @ 100cc/hr  - f/u blood/urine cxs and sensitivities   - f/u legionella urine ag  - c/w broad spectrum antibiotics        Acute hypoxic respiratory failure possibly 2/2 lethargy vs PE vs less likely PNA  - Elevated Ddimer.  f/u CTA to r/o PE  - Covid (-).    - No evidence of PNA on CXR  - Monitor on  and c/w supplemental O2 w/ goal sat >94%          Acute metabolic encephalopathy likely multifactorial 2/2 sepsis, hypoxia and hyponatremia  - Pt has underlying hx of MR but at baseline reported to be oriented to person and place  - Hyponatremia could be 2/2 dehydration   - c/w IVFs, trend lytes and monitor I/O's      Decubitous ulcers present on admission  - 2 stage 2 left gluteal pressure ulcers  - Consider wound care consult  - Frequent repositioning       Bipolar Disorder / Seizure Disorder  - c/w home medications      Hypothyroidism  - c/w levothyroixine      Hx of CVA  - c/w ASA and Plavix      Hx of Atrial flutter  - Currently in NSR  - c/w Metoprolol      DVT ppx: lovenox SQ

## 2020-10-09 NOTE — H&P ADULT - HISTORY OF PRESENT ILLNESS
Patient informed and refill sent.    66 y/o M w/ a PMH of MR, HTN, bipolar disease, seizure disorder was BIBA from [facility] after being found to have a fever x 1 day.  Pt is a poor historian, lethargic and information was obtained from chart.  Facility reported pt had fever of 102.4 and hypoxia to 82%, placed on 2L NC and improved to >90%.   At baseline pt is wheelchair bound, verbal and oriented to person and place.  Of note, pt has a chronic buckley and PEG.

## 2020-10-09 NOTE — H&P ADULT - NSHPPHYSICALEXAM_GEN_ALL_CORE
GENERAL: pt lying in bed, thin male, lethargic, on 3L NC  HEENT: NC/AT, dry oral mucosa, PERRL, no conjunctival inection or icteric sclera  NECK: no JVD, supple  CARDIOVASCULAR: normal s1, s2, RRR  RESPIRATORY: poor inspiratory effort, CTA b/l, no wheezing, rhonchi, rales  ABDOMEN: soft, NT/ND, +BS, PEG present  EXTREMITIES: warm to touch, no cyanosis, trace pedal edema, pulses 2+  NEURO: lethargic but arousable to tactile stim, nonfocal   PSYCH: unable to assess due to lethargy  SKIN: 2 stage 2 left gluteal pressure ulcers present on admission

## 2020-10-09 NOTE — ED PROVIDER NOTE - PHYSICAL EXAMINATION
Vital Signs per nursing documentation  Gen: well appearing, cachectic gentleman  HEENT: NCAT, dry membranes  Cardiac: regular rate rhythm, normal S1S2  Chest: clear to auscultation bilateral, no wheezes or crackles  Abdomen: soft, non tender non distended, PEG tube in place  Extremity: no gross deformity, good perfusion  Skin: no rash  Neuro: moving all extremities

## 2020-10-09 NOTE — ED ADULT TRIAGE NOTE - CHIEF COMPLAINT QUOTE
pt comes to ed from group home for eval of fever of unknown origin. t max 102.4 was given tylenol. patient temp here 101.6 orally. pt spo2 85% room air prior to arrival- 100% on 3l nasal cannula. pressure ulcer left gluteal fold.

## 2020-10-09 NOTE — ED PROVIDER NOTE - PROGRESS NOTE DETAILS
ddimer elevated, CTA ordered. admitted to medicine, hospitalist aware of need of CT f/u Given the significant threats to this patient based on initial presentation, the benefits of emergency contrast-enhanced CT imaging without obtaining consent greatly outweigh the potential risk of harm given elevated ddimer. Patient with MR and unable to consent

## 2020-10-09 NOTE — H&P ADULT - NSHPLABSRESULTS_GEN_ALL_CORE
10.4   32.23 )-----------( 312      ( 09 Oct 2020 16:07 )             31.7     10-09    128<L>  |  90<L>  |  18.0  ----------------------------<  95  3.9   |  25.0  |  0.52    Ca    9.5      09 Oct 2020 16:07    TPro  8.3  /  Alb  3.9  /  TBili  0.4  /  DBili  x   /  AST  26  /  ALT  19  /  AlkPhos  83  10-09      CXR: negative    EKG: NSR, QTc 460    UA + for WBC,  moderate bacteria, nitrates, LE

## 2020-10-09 NOTE — ED ADULT NURSE REASSESSMENT NOTE - NS ED NURSE REASSESS COMMENT FT1
report given to SHOSHANA Delvalle. Pt awaiting CTA and admit bed. Pt POC explained and verbalized understanding.

## 2020-10-10 LAB
ANION GAP SERPL CALC-SCNC: 13 MMOL/L — SIGNIFICANT CHANGE UP (ref 5–17)
BASOPHILS # BLD AUTO: 0.05 K/UL — SIGNIFICANT CHANGE UP (ref 0–0.2)
BASOPHILS NFR BLD AUTO: 0.2 % — SIGNIFICANT CHANGE UP (ref 0–2)
BUN SERPL-MCNC: 14 MG/DL — SIGNIFICANT CHANGE UP (ref 8–20)
CALCIUM SERPL-MCNC: 9.2 MG/DL — SIGNIFICANT CHANGE UP (ref 8.6–10.2)
CHLORIDE SERPL-SCNC: 97 MMOL/L — LOW (ref 98–107)
CO2 SERPL-SCNC: 24 MMOL/L — SIGNIFICANT CHANGE UP (ref 22–29)
CREAT SERPL-MCNC: 0.44 MG/DL — LOW (ref 0.5–1.3)
EOSINOPHIL # BLD AUTO: 0 K/UL — SIGNIFICANT CHANGE UP (ref 0–0.5)
EOSINOPHIL NFR BLD AUTO: 0 % — SIGNIFICANT CHANGE UP (ref 0–6)
GLUCOSE SERPL-MCNC: 110 MG/DL — HIGH (ref 70–99)
HCT VFR BLD CALC: 29.4 % — LOW (ref 39–50)
HCV AB S/CO SERPL IA: 0.1 S/CO — SIGNIFICANT CHANGE UP (ref 0–0.99)
HCV AB SERPL-IMP: SIGNIFICANT CHANGE UP
HGB BLD-MCNC: 9.5 G/DL — LOW (ref 13–17)
IMM GRANULOCYTES NFR BLD AUTO: 0.8 % — SIGNIFICANT CHANGE UP (ref 0–1.5)
LYMPHOCYTES # BLD AUTO: 0.9 K/UL — LOW (ref 1–3.3)
LYMPHOCYTES # BLD AUTO: 3.8 % — LOW (ref 13–44)
MCHC RBC-ENTMCNC: 30.9 PG — SIGNIFICANT CHANGE UP (ref 27–34)
MCHC RBC-ENTMCNC: 32.3 GM/DL — SIGNIFICANT CHANGE UP (ref 32–36)
MCV RBC AUTO: 95.8 FL — SIGNIFICANT CHANGE UP (ref 80–100)
MONOCYTES # BLD AUTO: 1.49 K/UL — HIGH (ref 0–0.9)
MONOCYTES NFR BLD AUTO: 6.2 % — SIGNIFICANT CHANGE UP (ref 2–14)
NEUTROPHILS # BLD AUTO: 21.34 K/UL — HIGH (ref 1.8–7.4)
NEUTROPHILS NFR BLD AUTO: 89 % — HIGH (ref 43–77)
PLATELET # BLD AUTO: 268 K/UL — SIGNIFICANT CHANGE UP (ref 150–400)
POTASSIUM SERPL-MCNC: 3.3 MMOL/L — LOW (ref 3.5–5.3)
POTASSIUM SERPL-SCNC: 3.3 MMOL/L — LOW (ref 3.5–5.3)
RBC # BLD: 3.07 M/UL — LOW (ref 4.2–5.8)
RBC # FLD: 15.4 % — HIGH (ref 10.3–14.5)
SARS-COV-2 IGG SERPL QL IA: NEGATIVE — SIGNIFICANT CHANGE UP
SARS-COV-2 IGM SERPL IA-ACNC: 0.1 INDEX — SIGNIFICANT CHANGE UP
SODIUM SERPL-SCNC: 134 MMOL/L — LOW (ref 135–145)
WBC # BLD: 23.97 K/UL — HIGH (ref 3.8–10.5)
WBC # FLD AUTO: 23.97 K/UL — HIGH (ref 3.8–10.5)

## 2020-10-10 PROCEDURE — 99233 SBSQ HOSP IP/OBS HIGH 50: CPT

## 2020-10-10 RX ORDER — LEVOTHYROXINE SODIUM 125 MCG
112.5 TABLET ORAL AT BEDTIME
Refills: 0 | Status: DISCONTINUED | OUTPATIENT
Start: 2020-10-10 | End: 2020-10-10

## 2020-10-10 RX ORDER — POTASSIUM CHLORIDE 20 MEQ
40 PACKET (EA) ORAL ONCE
Refills: 0 | Status: COMPLETED | OUTPATIENT
Start: 2020-10-10 | End: 2020-10-10

## 2020-10-10 RX ORDER — METOPROLOL TARTRATE 50 MG
50 TABLET ORAL
Refills: 0 | Status: DISCONTINUED | OUTPATIENT
Start: 2020-10-10 | End: 2020-10-12

## 2020-10-10 RX ORDER — LEVOTHYROXINE SODIUM 125 MCG
225 TABLET ORAL DAILY
Refills: 0 | Status: DISCONTINUED | OUTPATIENT
Start: 2020-10-10 | End: 2020-10-15

## 2020-10-10 RX ORDER — PANTOPRAZOLE SODIUM 20 MG/1
40 TABLET, DELAYED RELEASE ORAL DAILY
Refills: 0 | Status: DISCONTINUED | OUTPATIENT
Start: 2020-10-10 | End: 2020-10-15

## 2020-10-10 RX ORDER — LEVOTHYROXINE SODIUM 125 MCG
84 TABLET ORAL AT BEDTIME
Refills: 0 | Status: DISCONTINUED | OUTPATIENT
Start: 2020-10-10 | End: 2020-10-10

## 2020-10-10 RX ORDER — ACETAMINOPHEN 500 MG
650 TABLET ORAL EVERY 6 HOURS
Refills: 0 | Status: DISCONTINUED | OUTPATIENT
Start: 2020-10-10 | End: 2020-10-15

## 2020-10-10 RX ORDER — SODIUM CHLORIDE 9 MG/ML
1000 INJECTION INTRAMUSCULAR; INTRAVENOUS; SUBCUTANEOUS
Refills: 0 | Status: DISCONTINUED | OUTPATIENT
Start: 2020-10-10 | End: 2020-10-11

## 2020-10-10 RX ORDER — DIVALPROEX SODIUM 500 MG/1
500 TABLET, DELAYED RELEASE ORAL
Refills: 0 | Status: DISCONTINUED | OUTPATIENT
Start: 2020-10-10 | End: 2020-10-15

## 2020-10-10 RX ORDER — TAMSULOSIN HYDROCHLORIDE 0.4 MG/1
0.4 CAPSULE ORAL AT BEDTIME
Refills: 0 | Status: DISCONTINUED | OUTPATIENT
Start: 2020-10-10 | End: 2020-10-15

## 2020-10-10 RX ORDER — LEVETIRACETAM 250 MG/1
750 TABLET, FILM COATED ORAL
Refills: 0 | Status: DISCONTINUED | OUTPATIENT
Start: 2020-10-10 | End: 2020-10-15

## 2020-10-10 RX ORDER — SODIUM CHLORIDE 9 MG/ML
250 INJECTION INTRAMUSCULAR; INTRAVENOUS; SUBCUTANEOUS ONCE
Refills: 0 | Status: COMPLETED | OUTPATIENT
Start: 2020-10-10 | End: 2020-10-10

## 2020-10-10 RX ORDER — PANTOPRAZOLE SODIUM 20 MG/1
40 TABLET, DELAYED RELEASE ORAL DAILY
Refills: 0 | Status: DISCONTINUED | OUTPATIENT
Start: 2020-10-10 | End: 2020-10-10

## 2020-10-10 RX ORDER — METOPROLOL TARTRATE 50 MG
5 TABLET ORAL EVERY 12 HOURS
Refills: 0 | Status: DISCONTINUED | OUTPATIENT
Start: 2020-10-10 | End: 2020-10-10

## 2020-10-10 RX ADMIN — Medication 5 MILLIGRAM(S): at 06:59

## 2020-10-10 RX ADMIN — Medication 40 MILLIEQUIVALENT(S): at 08:59

## 2020-10-10 RX ADMIN — PANTOPRAZOLE SODIUM 40 MILLIGRAM(S): 20 TABLET, DELAYED RELEASE ORAL at 12:59

## 2020-10-10 RX ADMIN — Medication 650 MILLIGRAM(S): at 18:10

## 2020-10-10 RX ADMIN — SODIUM CHLORIDE 500 MILLILITER(S): 9 INJECTION INTRAMUSCULAR; INTRAVENOUS; SUBCUTANEOUS at 10:35

## 2020-10-10 RX ADMIN — TAMSULOSIN HYDROCHLORIDE 0.4 MILLIGRAM(S): 0.4 CAPSULE ORAL at 22:46

## 2020-10-10 RX ADMIN — SERTRALINE 100 MILLIGRAM(S): 25 TABLET, FILM COATED ORAL at 12:59

## 2020-10-10 RX ADMIN — CLOPIDOGREL BISULFATE 75 MILLIGRAM(S): 75 TABLET, FILM COATED ORAL at 12:58

## 2020-10-10 RX ADMIN — Medication 50 MILLIGRAM(S): at 18:10

## 2020-10-10 RX ADMIN — Medication 64.8 MILLIGRAM(S): at 18:36

## 2020-10-10 RX ADMIN — SODIUM CHLORIDE 100 MILLILITER(S): 9 INJECTION INTRAMUSCULAR; INTRAVENOUS; SUBCUTANEOUS at 08:59

## 2020-10-10 RX ADMIN — Medication 125 MILLIGRAM(S): at 07:00

## 2020-10-10 RX ADMIN — PIPERACILLIN AND TAZOBACTAM 25 GRAM(S): 4; .5 INJECTION, POWDER, LYOPHILIZED, FOR SOLUTION INTRAVENOUS at 07:00

## 2020-10-10 RX ADMIN — PIPERACILLIN AND TAZOBACTAM 25 GRAM(S): 4; .5 INJECTION, POWDER, LYOPHILIZED, FOR SOLUTION INTRAVENOUS at 13:01

## 2020-10-10 RX ADMIN — PIPERACILLIN AND TAZOBACTAM 25 GRAM(S): 4; .5 INJECTION, POWDER, LYOPHILIZED, FOR SOLUTION INTRAVENOUS at 22:46

## 2020-10-10 RX ADMIN — Medication 250 MILLIGRAM(S): at 13:01

## 2020-10-10 RX ADMIN — LORATADINE 10 MILLIGRAM(S): 10 TABLET ORAL at 12:59

## 2020-10-10 RX ADMIN — DIVALPROEX SODIUM 500 MILLIGRAM(S): 500 TABLET, DELAYED RELEASE ORAL at 18:20

## 2020-10-10 RX ADMIN — HEPARIN SODIUM 5000 UNIT(S): 5000 INJECTION INTRAVENOUS; SUBCUTANEOUS at 06:59

## 2020-10-10 RX ADMIN — SODIUM CHLORIDE 100 MILLILITER(S): 9 INJECTION INTRAMUSCULAR; INTRAVENOUS; SUBCUTANEOUS at 10:33

## 2020-10-10 RX ADMIN — Medication 250 MILLIGRAM(S): at 07:00

## 2020-10-10 RX ADMIN — HEPARIN SODIUM 5000 UNIT(S): 5000 INJECTION INTRAVENOUS; SUBCUTANEOUS at 18:11

## 2020-10-10 RX ADMIN — LEVETIRACETAM 750 MILLIGRAM(S): 250 TABLET, FILM COATED ORAL at 18:19

## 2020-10-10 RX ADMIN — Medication 81 MILLIGRAM(S): at 12:58

## 2020-10-10 RX ADMIN — Medication 64.8 MILLIGRAM(S): at 06:59

## 2020-10-10 RX ADMIN — Medication 1 TABLET(S): at 12:58

## 2020-10-10 NOTE — PROGRESS NOTE ADULT - SUBJECTIVE AND OBJECTIVE BOX
Fever due to unspecified condition    HPI:  64 y/o M w/ a PMH of MR, HTN, bipolar disease, seizure disorder was BIBA from [facility] after being found to have a fever x 1 day.  Pt is a poor historian, lethargic and information was obtained from chart.  Facility reported pt had fever of 102.4 and hypoxia to 82%, placed on 2L NC and improved to >90%.   At baseline pt is wheelchair bound, verbal and oriented to person and place.  Of note, pt has a chronic buckley and PEG.   (09 Oct 2020 17:39)    Interval History:  Patient was seen and examined at bedside. Woke him up from sleep. Not happy. Not providing any information. Saying everything is OK.     ROS:  As per interval history otherwise unremarkable.    PHYSICAL EXAM:  Vital Signs   T(C): 37.4 (10 Oct 2020 05:41), Max: 38.7 (09 Oct 2020 14:01)  T(F): 99.3 (10 Oct 2020 05:41), Max: 101.6 (09 Oct 2020 14:01)  HR: 105 (10 Oct 2020 05:41) (65 - 105)  BP: 101/61 (10 Oct 2020 05:41) (95/59 - 112/58)  RR: 20 (10 Oct 2020 05:41) (20 - 20)  SpO2: 99% (10 Oct 2020 05:41) (99% - 100%)  General: Elderly male lying in bed comfortably. No acute distress  HEENT: EOMI. Clear conjunctivae. Moist mucus membrane  Neck: Supple.   Chest: CTA bilaterally. No wheezing, rales or rhonchi. N  Heart: Normal S1 & S2. RRR.   Abdomen: Soft. Non-tender. Non-distended. + BS. PEG in place.   Ext: 1+ pedal edema. No calf tenderness   Neuro: Sleeping   Skin: Warm and Dry  Psychiatry: Agitated     MEDICATIONS  (STANDING):  ascorbic acid 500 milliGRAM(s) Oral daily  aspirin  chewable 81 milliGRAM(s) Enteral Tube daily  clopidogrel Tablet 75 milliGRAM(s) Oral daily  heparin   Injectable 5000 Unit(s) SubCutaneous every 12 hours  lactated ringers. 1000 milliLiter(s) (75 mL/Hr) IV Continuous <Continuous>  levothyroxine Injectable 84 MICROGram(s) IV Push at bedtime  loratadine 10 milliGRAM(s) Oral daily  metoprolol tartrate Injectable 5 milliGRAM(s) IV Push every 12 hours  multivitamin 1 Tablet(s) Oral daily  pantoprazole  Injectable 40 milliGRAM(s) IV Push daily  PHENobarbital 64.8 milliGRAM(s) Oral two times a day  phenytoin   Suspension 125 milliGRAM(s) Enteral Tube two times a day  piperacillin/tazobactam IVPB.. 3.375 Gram(s) IV Intermittent every 8 hours  potassium chloride   Powder 40 milliEquivalent(s) Oral once  sertraline 100 milliGRAM(s) Oral daily  valproic  acid Syrup 250 milliGRAM(s) Enteral Tube three times a day    LABS:                        9.5    23.97 )-----------( 268      ( 10 Oct 2020 03:17 )             29.4     1010    134<L>  |  97<L>  |  14.0  ----------------------------<  110<H>  3.3<L>   |  24.0  |  0.44<L>    Ca    9.2      10 Oct 2020 03:17    TPro  8.3  /  Alb  3.9  /  TBili  0.4  /  DBili  x   /  AST  26  /  ALT  19  /  AlkPhos  83  10-09      Urinalysis Basic - ( 09 Oct 2020 16:05 )    Color: Yellow / Appearance: Slightly Turbid / S.015 / pH: x  Gluc: x / Ketone: Negative  / Bili: Negative / Urobili: Negative   Blood: x / Protein: 30 mg/dL / Nitrite: Positive   Leuk Esterase: Moderate / RBC: 3-5 /HPF / WBC 11-25   Sq Epi: x / Non Sq Epi: x / Bacteria: Moderate    RADIOLOGY & ADDITIONAL STUDIES:  Reviewed

## 2020-10-10 NOTE — PROGRESS NOTE ADULT - ASSESSMENT
INCOMPLETE 65 years old male,    1) Sepsis secondary to UTI  - Follow cultures  - Continue IVF  - Continue Zosyn    2) Acute Respiratory Failure with Hypoxia  - Likely due to Sepsis  - No PE or Pneumonia on CTA Chest   - Improving         3) Decubitus Ulcers  - Present on admission  - Patient is refusing exam. As per documents: 2 stage 2 left gluteal pressure ulcers  - Wound care consult  - Frequent repositioning     4) History of CVA  - Continue Aspirin     5) Chronic A. Flutter  - Resume Metoprolol  - Not on AC    6) Hypothyroidism  - Continue Synthroid    7) Seizure Disorder   - Continue Keppra    8) Dysphagia  - Swallow eval  - Tube feeding via PEG    DVT Prophylaxis -- Heparin SQ    Dispo: [facility] once medically stable.    65 years old male,    1) Sepsis secondary to UTI  - Follow cultures  - Continue IVF  - Continue Zosyn    2) Acute Respiratory Failure with Hypoxia  - Likely due to Sepsis  - No PE or Pneumonia on CTA Chest   - Improving         3) Decubitus Ulcers  - Present on admission  - Patient is refusing exam. As per documents: 2 stage 2 left gluteal pressure ulcers  - Wound care consult  - Frequent repositioning     4) History of CVA  - Continue Aspirin and Plavix   - Lipitor was discontinued in August 2020 as per records     5) Chronic A. Flutter  - s/p WATCHMAN  - Continue Plavix   - Resume Metoprolol  - Will resume Cardizem if BP remains stable     6) Hypothyroidism  - Continue Synthroid    7) Seizure Disorder   - Continue Keppra, Phenobarb and Divalproex     8) Dysphagia  - Swallow eval  - Tube feeding via PEG    DVT Prophylaxis -- Heparin SQ    Dispo: [facility] once medically stable.

## 2020-10-11 LAB
-  AMIKACIN: SIGNIFICANT CHANGE UP
-  AMIKACIN: SIGNIFICANT CHANGE UP
-  AMOXICILLIN/CLAVULANIC ACID: SIGNIFICANT CHANGE UP
-  AMOXICILLIN/CLAVULANIC ACID: SIGNIFICANT CHANGE UP
-  AMPICILLIN/SULBACTAM: SIGNIFICANT CHANGE UP
-  AMPICILLIN/SULBACTAM: SIGNIFICANT CHANGE UP
-  AMPICILLIN: SIGNIFICANT CHANGE UP
-  AMPICILLIN: SIGNIFICANT CHANGE UP
-  AZTREONAM: SIGNIFICANT CHANGE UP
-  AZTREONAM: SIGNIFICANT CHANGE UP
-  CEFAZOLIN: SIGNIFICANT CHANGE UP
-  CEFAZOLIN: SIGNIFICANT CHANGE UP
-  CEFEPIME: SIGNIFICANT CHANGE UP
-  CEFEPIME: SIGNIFICANT CHANGE UP
-  CEFOXITIN: SIGNIFICANT CHANGE UP
-  CEFOXITIN: SIGNIFICANT CHANGE UP
-  CEFTRIAXONE: SIGNIFICANT CHANGE UP
-  CEFTRIAXONE: SIGNIFICANT CHANGE UP
-  CIPROFLOXACIN: SIGNIFICANT CHANGE UP
-  CIPROFLOXACIN: SIGNIFICANT CHANGE UP
-  ERTAPENEM: SIGNIFICANT CHANGE UP
-  ERTAPENEM: SIGNIFICANT CHANGE UP
-  GENTAMICIN: SIGNIFICANT CHANGE UP
-  LEVOFLOXACIN: SIGNIFICANT CHANGE UP
-  LEVOFLOXACIN: SIGNIFICANT CHANGE UP
-  MEROPENEM: SIGNIFICANT CHANGE UP
-  MEROPENEM: SIGNIFICANT CHANGE UP
-  NITROFURANTOIN: SIGNIFICANT CHANGE UP
-  NITROFURANTOIN: SIGNIFICANT CHANGE UP
-  PIPERACILLIN/TAZOBACTAM: SIGNIFICANT CHANGE UP
-  PIPERACILLIN/TAZOBACTAM: SIGNIFICANT CHANGE UP
-  TIGECYCLINE: SIGNIFICANT CHANGE UP
-  TOBRAMYCIN: SIGNIFICANT CHANGE UP
-  TRIMETHOPRIM/SULFAMETHOXAZOLE: SIGNIFICANT CHANGE UP
-  TRIMETHOPRIM/SULFAMETHOXAZOLE: SIGNIFICANT CHANGE UP
ANION GAP SERPL CALC-SCNC: 13 MMOL/L — SIGNIFICANT CHANGE UP (ref 5–17)
BASOPHILS # BLD AUTO: 0.02 K/UL — SIGNIFICANT CHANGE UP (ref 0–0.2)
BASOPHILS NFR BLD AUTO: 0.3 % — SIGNIFICANT CHANGE UP (ref 0–2)
BUN SERPL-MCNC: 7 MG/DL — LOW (ref 8–20)
CALCIUM SERPL-MCNC: 8.5 MG/DL — LOW (ref 8.6–10.2)
CHLORIDE SERPL-SCNC: 101 MMOL/L — SIGNIFICANT CHANGE UP (ref 98–107)
CO2 SERPL-SCNC: 22 MMOL/L — SIGNIFICANT CHANGE UP (ref 22–29)
CREAT SERPL-MCNC: 0.37 MG/DL — LOW (ref 0.5–1.3)
CULTURE RESULTS: SIGNIFICANT CHANGE UP
EOSINOPHIL # BLD AUTO: 0 K/UL — SIGNIFICANT CHANGE UP (ref 0–0.5)
EOSINOPHIL NFR BLD AUTO: 0 % — SIGNIFICANT CHANGE UP (ref 0–6)
GLUCOSE SERPL-MCNC: 126 MG/DL — HIGH (ref 70–99)
HCT VFR BLD CALC: 24.1 % — LOW (ref 39–50)
HGB BLD-MCNC: 7.9 G/DL — LOW (ref 13–17)
IMM GRANULOCYTES NFR BLD AUTO: 0.5 % — SIGNIFICANT CHANGE UP (ref 0–1.5)
LEGIONELLA AG UR QL: NEGATIVE — SIGNIFICANT CHANGE UP
LYMPHOCYTES # BLD AUTO: 0.65 K/UL — LOW (ref 1–3.3)
LYMPHOCYTES # BLD AUTO: 10 % — LOW (ref 13–44)
MAGNESIUM SERPL-MCNC: 1.6 MG/DL — SIGNIFICANT CHANGE UP (ref 1.6–2.6)
MCHC RBC-ENTMCNC: 31.1 PG — SIGNIFICANT CHANGE UP (ref 27–34)
MCHC RBC-ENTMCNC: 32.8 GM/DL — SIGNIFICANT CHANGE UP (ref 32–36)
MCV RBC AUTO: 94.9 FL — SIGNIFICANT CHANGE UP (ref 80–100)
METHOD TYPE: SIGNIFICANT CHANGE UP
METHOD TYPE: SIGNIFICANT CHANGE UP
MONOCYTES # BLD AUTO: 0.43 K/UL — SIGNIFICANT CHANGE UP (ref 0–0.9)
MONOCYTES NFR BLD AUTO: 6.6 % — SIGNIFICANT CHANGE UP (ref 2–14)
NEUTROPHILS # BLD AUTO: 5.37 K/UL — SIGNIFICANT CHANGE UP (ref 1.8–7.4)
NEUTROPHILS NFR BLD AUTO: 82.6 % — HIGH (ref 43–77)
OB PNL STL: NEGATIVE — SIGNIFICANT CHANGE UP
ORGANISM # SPEC MICROSCOPIC CNT: SIGNIFICANT CHANGE UP
PHOSPHATE SERPL-MCNC: 2.2 MG/DL — LOW (ref 2.4–4.7)
PLATELET # BLD AUTO: 203 K/UL — SIGNIFICANT CHANGE UP (ref 150–400)
POTASSIUM SERPL-MCNC: 2.8 MMOL/L — CRITICAL LOW (ref 3.5–5.3)
POTASSIUM SERPL-SCNC: 2.8 MMOL/L — CRITICAL LOW (ref 3.5–5.3)
RBC # BLD: 2.54 M/UL — LOW (ref 4.2–5.8)
RBC # FLD: 15.3 % — HIGH (ref 10.3–14.5)
SODIUM SERPL-SCNC: 136 MMOL/L — SIGNIFICANT CHANGE UP (ref 135–145)
SPECIMEN SOURCE: SIGNIFICANT CHANGE UP
WBC # BLD: 6.5 K/UL — SIGNIFICANT CHANGE UP (ref 3.8–10.5)
WBC # FLD AUTO: 6.5 K/UL — SIGNIFICANT CHANGE UP (ref 3.8–10.5)

## 2020-10-11 PROCEDURE — 99233 SBSQ HOSP IP/OBS HIGH 50: CPT

## 2020-10-11 RX ORDER — MUPIROCIN 20 MG/G
1 OINTMENT TOPICAL
Refills: 0 | Status: DISCONTINUED | OUTPATIENT
Start: 2020-10-11 | End: 2020-10-15

## 2020-10-11 RX ORDER — COLLAGENASE CLOSTRIDIUM HIST. 250 UNIT/G
1 OINTMENT (GRAM) TOPICAL DAILY
Refills: 0 | Status: DISCONTINUED | OUTPATIENT
Start: 2020-10-11 | End: 2020-10-15

## 2020-10-11 RX ORDER — SODIUM,POTASSIUM PHOSPHATES 278-250MG
1 POWDER IN PACKET (EA) ORAL
Refills: 0 | Status: COMPLETED | OUTPATIENT
Start: 2020-10-11 | End: 2020-10-13

## 2020-10-11 RX ORDER — POTASSIUM CHLORIDE 20 MEQ
40 PACKET (EA) ORAL EVERY 4 HOURS
Refills: 0 | Status: COMPLETED | OUTPATIENT
Start: 2020-10-11 | End: 2020-10-11

## 2020-10-11 RX ORDER — LACTOBACILLUS ACIDOPHILUS 100MM CELL
1 CAPSULE ORAL
Refills: 0 | Status: DISCONTINUED | OUTPATIENT
Start: 2020-10-11 | End: 2020-10-15

## 2020-10-11 RX ORDER — MAGNESIUM OXIDE 400 MG ORAL TABLET 241.3 MG
400 TABLET ORAL
Refills: 0 | Status: COMPLETED | OUTPATIENT
Start: 2020-10-11 | End: 2020-10-13

## 2020-10-11 RX ADMIN — Medication 40 MILLIEQUIVALENT(S): at 09:04

## 2020-10-11 RX ADMIN — DIVALPROEX SODIUM 500 MILLIGRAM(S): 500 TABLET, DELAYED RELEASE ORAL at 18:16

## 2020-10-11 RX ADMIN — Medication 500 MILLIGRAM(S): at 09:05

## 2020-10-11 RX ADMIN — Medication 650 MILLIGRAM(S): at 21:23

## 2020-10-11 RX ADMIN — PIPERACILLIN AND TAZOBACTAM 25 GRAM(S): 4; .5 INJECTION, POWDER, LYOPHILIZED, FOR SOLUTION INTRAVENOUS at 05:37

## 2020-10-11 RX ADMIN — Medication 1 TABLET(S): at 09:05

## 2020-10-11 RX ADMIN — LORATADINE 10 MILLIGRAM(S): 10 TABLET ORAL at 09:05

## 2020-10-11 RX ADMIN — CLOPIDOGREL BISULFATE 75 MILLIGRAM(S): 75 TABLET, FILM COATED ORAL at 09:05

## 2020-10-11 RX ADMIN — HEPARIN SODIUM 5000 UNIT(S): 5000 INJECTION INTRAVENOUS; SUBCUTANEOUS at 18:18

## 2020-10-11 RX ADMIN — Medication 40 MILLIEQUIVALENT(S): at 13:34

## 2020-10-11 RX ADMIN — PIPERACILLIN AND TAZOBACTAM 25 GRAM(S): 4; .5 INJECTION, POWDER, LYOPHILIZED, FOR SOLUTION INTRAVENOUS at 13:34

## 2020-10-11 RX ADMIN — Medication 650 MILLIGRAM(S): at 22:30

## 2020-10-11 RX ADMIN — HEPARIN SODIUM 5000 UNIT(S): 5000 INJECTION INTRAVENOUS; SUBCUTANEOUS at 05:36

## 2020-10-11 RX ADMIN — PANTOPRAZOLE SODIUM 40 MILLIGRAM(S): 20 TABLET, DELAYED RELEASE ORAL at 09:13

## 2020-10-11 RX ADMIN — Medication 1 TABLET(S): at 18:16

## 2020-10-11 RX ADMIN — DIVALPROEX SODIUM 500 MILLIGRAM(S): 500 TABLET, DELAYED RELEASE ORAL at 05:36

## 2020-10-11 RX ADMIN — Medication 64.8 MILLIGRAM(S): at 05:41

## 2020-10-11 RX ADMIN — Medication 50 MILLIGRAM(S): at 18:22

## 2020-10-11 RX ADMIN — Medication 50 MILLIGRAM(S): at 05:36

## 2020-10-11 RX ADMIN — SERTRALINE 100 MILLIGRAM(S): 25 TABLET, FILM COATED ORAL at 09:06

## 2020-10-11 RX ADMIN — Medication 64.8 MILLIGRAM(S): at 18:17

## 2020-10-11 RX ADMIN — LEVETIRACETAM 750 MILLIGRAM(S): 250 TABLET, FILM COATED ORAL at 05:36

## 2020-10-11 RX ADMIN — PIPERACILLIN AND TAZOBACTAM 25 GRAM(S): 4; .5 INJECTION, POWDER, LYOPHILIZED, FOR SOLUTION INTRAVENOUS at 21:23

## 2020-10-11 RX ADMIN — Medication 1 TABLET(S): at 21:24

## 2020-10-11 RX ADMIN — Medication 81 MILLIGRAM(S): at 09:05

## 2020-10-11 RX ADMIN — MAGNESIUM OXIDE 400 MG ORAL TABLET 400 MILLIGRAM(S): 241.3 TABLET ORAL at 09:07

## 2020-10-11 RX ADMIN — MAGNESIUM OXIDE 400 MG ORAL TABLET 400 MILLIGRAM(S): 241.3 TABLET ORAL at 18:16

## 2020-10-11 RX ADMIN — LEVETIRACETAM 750 MILLIGRAM(S): 250 TABLET, FILM COATED ORAL at 18:17

## 2020-10-11 RX ADMIN — Medication 40 MILLIEQUIVALENT(S): at 18:16

## 2020-10-11 RX ADMIN — MAGNESIUM OXIDE 400 MG ORAL TABLET 400 MILLIGRAM(S): 241.3 TABLET ORAL at 13:34

## 2020-10-11 RX ADMIN — Medication 225 MICROGRAM(S): at 05:36

## 2020-10-11 RX ADMIN — MUPIROCIN 1 APPLICATION(S): 20 OINTMENT TOPICAL at 18:23

## 2020-10-11 RX ADMIN — Medication 1 TABLET(S): at 13:34

## 2020-10-11 NOTE — PROGRESS NOTE ADULT - ASSESSMENT
65 years old male,    1) Sepsis secondary to UTI  - Urine culture with Providencia and Proteus, sensitivities pending  - Blood cultures pending   - Continue IVF  - Continue Zosyn    2) Acute Respiratory Failure with Hypoxia  - Likely due to Sepsis  - No PE or Pneumonia on CTA Chest   - Improving         3) Decubitus Ulcer  - Present on admission  - Patient is refusing exam. As per documents: Stage 2 left gluteal pressure ulcer  - Wound care consult  - Frequent repositioning   - Air fluidized bed     4) History of CVA  - Continue Aspirin and Plavix   - Lipitor was discontinued in August 2020 as per records     5) Chronic A. Flutter  - s/p WATCHMAN  - Continue Plavix   - Resume Metoprolol  - Will resume Cardizem if BP remains stable     6) Hypothyroidism  - Continue Synthroid    7) Seizure Disorder   - Continue Keppra, Phenobarb and Divalproex     8) Dysphagia  - Not cooperating with speech therapy for swallow eval  - Tube feeding via PEG    9) Anemia  - FOBT and Iron studies    10) Hypokalemia / Hypophosphatemia  - Replete     11) Hyponatremia  - Resolved    DVT Prophylaxis -- Heparin SQ    Dispo: [facility] in 48 hours    65 years old male,    1) Sepsis secondary to UTI  - Urine culture with Providencia and Proteus, sensitivities pending  - Blood cultures pending   - Continue IVF  - Continue Zosyn    2) Acute Respiratory Failure with Hypoxia  - Likely due to Sepsis  - No PE or Pneumonia on CTA Chest   - Improving         3) Decubitus Ulcers  - Present on admission  - Patient is refusing exam. As per documents: Stage 2 left gluteal and right heel pressure ulcers   - Wound care consult  - Frequent repositioning   - Heel Off    - Air fluidized bed     4) History of CVA  - Continue Aspirin and Plavix   - Lipitor was discontinued in August 2020 as per records     5) Chronic A. Flutter  - s/p WATCHMAN  - Continue Plavix   - Resume Metoprolol  - Will resume Cardizem if BP remains stable     6) Hypothyroidism  - Continue Synthroid    7) Seizure Disorder   - Continue Keppra, Phenobarb and Divalproex     8) Dysphagia  - Not cooperating with speech therapy for swallow eval  - Tube feeding via PEG    9) Anemia  - FOBT and Iron studies    10) Hypokalemia / Hypophosphatemia  - Replete     11) Hyponatremia  - Resolved    DVT Prophylaxis -- Heparin SQ    Dispo: [facility] in 48 hours    65 years old male with PMH of MR, Seizure Disorder, CVA, A. Flutter s/p WATCHMAN, Hypothyroidism and Indwelling Garcia's Catheter presented from Group Home with fever and hypoxia,     1) Sepsis secondary to UTI  - Has chronic indwelling Garcia's catheter, changed in ER  - Urine culture with Providencia and Proteus, sensitivities pending  - Blood cultures pending   - Continue IVF  - Continue Zosyn    2) Acute Respiratory Failure with Hypoxia  - Likely due to Sepsis  - No PE or Pneumonia on CTA Chest   - Improving         3) Decubitus Ulcers  - Present on admission  - Patient is refusing exam. As per documents: Stage 2 left gluteal and right heel pressure ulcers   - Wound care consult  - Frequent repositioning   - Heel Off    - Air fluidized bed     4) History of CVA  - Continue Aspirin and Plavix   - Lipitor was discontinued in August 2020 as per records     5) Chronic A. Flutter  - s/p WATCHMAN  - Continue Plavix   - Resume Metoprolol  - Will resume Cardizem if BP remains stable     6) Hypothyroidism  - Continue Synthroid    7) Seizure Disorder   - Continue Keppra, Phenobarb and Divalproex     8) Dysphagia  - Not cooperating with speech therapy for swallow eval  - Tube feeding via PEG    9) Anemia  - FOBT and Iron studies    10) Hypokalemia / Hypophosphatemia  - Replete     11) Hyponatremia  - Resolved    DVT Prophylaxis -- Heparin SQ    Dispo: [facility] in 48 hours

## 2020-10-11 NOTE — PROGRESS NOTE ADULT - SUBJECTIVE AND OBJECTIVE BOX
Fever due to unspecified condition    HPI:  64 y/o M w/ a PMH of MR, HTN, bipolar disease, seizure disorder was BIBA from [facility] after being found to have a fever x 1 day.  Pt is a poor historian, lethargic and information was obtained from chart.  Facility reported pt had fever of 102.4 and hypoxia to 82%, placed on 2L NC and improved to >90%.   At baseline pt is wheelchair bound, verbal and oriented to person and place.  Of note, pt has a chronic buckley and PEG.   (09 Oct 2020 17:39)    Interval History:  Patient was seen and examined at bedside around 8:45 am.  Awake but not in a good mood. Not providing any information.   No overnight issues as per RN.     ROS:  As per interval history otherwise unremarkable.    PHYSICAL EXAM:  Vital Signs   T(C): 37.3 (11 Oct 2020 11:15), Max: 38.2 (10 Oct 2020 15:48)  T(F): 99.1 (11 Oct 2020 11:15), Max: 100.7 (10 Oct 2020 15:48)  HR: 108 (11 Oct 2020 11:15) (73 - 129)  BP: 119/63 (11 Oct 2020 11:15) (91/58 - 128/55)  RR: 18 (11 Oct 2020 11:15) (16 - 20)  SpO2: 96% (11 Oct 2020 11:15) (96% - 99%)  General: Elderly male lying in bed comfortably. No acute distress  HEENT: EOMI. Clear conjunctivae. Moist mucus membrane  Neck: Supple.   Chest: CTA bilaterally. No wheezing, rales or rhonchi. N  Heart: Normal S1 & S2. RRR.   Abdomen: Soft. Non-tender. Non-distended. + BS. PEG in place.   Ext: 1+ pedal edema. No calf tenderness   Neuro: Awake   Skin: Warm and Dry  Psychiatry: Agitated     MEDICATIONS  (STANDING):  ascorbic acid 500 milliGRAM(s) Oral daily  aspirin  chewable 81 milliGRAM(s) Enteral Tube daily  clopidogrel Tablet 75 milliGRAM(s) Oral daily  diVALproex Sprinkle 500 milliGRAM(s) Oral two times a day  heparin   Injectable 5000 Unit(s) SubCutaneous every 12 hours  levETIRAcetam  Solution 750 milliGRAM(s) Oral two times a day  levothyroxine 225 MICROGram(s) Oral daily  loratadine 10 milliGRAM(s) Oral daily  magnesium oxide 400 milliGRAM(s) Oral three times a day with meals  metoprolol tartrate 50 milliGRAM(s) Oral two times a day  multivitamin 1 Tablet(s) Oral daily  pantoprazole   Suspension 40 milliGRAM(s) Oral daily  PHENobarbital 64.8 milliGRAM(s) Oral two times a day  piperacillin/tazobactam IVPB.. 3.375 Gram(s) IV Intermittent every 8 hours  potassium chloride   Powder 40 milliEquivalent(s) Oral every 4 hours  potassium phosphate / sodium phosphate Tablet (K-PHOS No. 2) 1 Tablet(s) Oral four times a day with meals  sertraline 100 milliGRAM(s) Oral daily  sodium chloride 0.9%. 1000 milliLiter(s) (100 mL/Hr) IV Continuous <Continuous>  tamsulosin 0.4 milliGRAM(s) Oral at bedtime    MEDICATIONS  (PRN):  acetaminophen    Suspension .. 650 milliGRAM(s) Oral every 6 hours PRN Temp greater or equal to 38C (100.4F), Mild Pain (1 - 3), Moderate Pain (4 - 6)    LABS:                        7.9    6.50  )-----------( 203      ( 11 Oct 2020 07:12 )             24.1     10-11    136  |  101  |  7.0<L>  ----------------------------<  126<H>  2.8<LL>   |  22.0  |  0.37<L>    Ca    8.5<L>      11 Oct 2020 07:12  Phos  2.2     10-11  Mg     1.6     10-11    TPro  8.3  /  Alb  3.9  /  TBili  0.4  /  DBili  x   /  AST  26  /  ALT  19  /  AlkPhos  83  10-09    Blood Culture: 10-09 @ 21:58  Organism --  Gram Stain Blood -- Gram Stain --  Specimen Source .Urine Clean Catch (Midstream)  Culture-Blood --    RADIOLOGY & ADDITIONAL STUDIES:  Reviewed

## 2020-10-11 NOTE — PROVIDER CONTACT NOTE (CRITICAL VALUE NOTIFICATION) - SITUATION
Dr. Lofton made aware of potassium level 2.8. Will put in orders to supplement potassium level. Will continue to monitor.

## 2020-10-12 LAB
ANION GAP SERPL CALC-SCNC: 13 MMOL/L — SIGNIFICANT CHANGE UP (ref 5–17)
BASOPHILS # BLD AUTO: 0.02 K/UL — SIGNIFICANT CHANGE UP (ref 0–0.2)
BASOPHILS NFR BLD AUTO: 0.4 % — SIGNIFICANT CHANGE UP (ref 0–2)
BUN SERPL-MCNC: 5 MG/DL — LOW (ref 8–20)
CALCIUM SERPL-MCNC: 8.9 MG/DL — SIGNIFICANT CHANGE UP (ref 8.6–10.2)
CHLORIDE SERPL-SCNC: 97 MMOL/L — LOW (ref 98–107)
CO2 SERPL-SCNC: 25 MMOL/L — SIGNIFICANT CHANGE UP (ref 22–29)
CREAT SERPL-MCNC: 0.32 MG/DL — LOW (ref 0.5–1.3)
EOSINOPHIL # BLD AUTO: 0.07 K/UL — SIGNIFICANT CHANGE UP (ref 0–0.5)
EOSINOPHIL NFR BLD AUTO: 1.4 % — SIGNIFICANT CHANGE UP (ref 0–6)
FERRITIN SERPL-MCNC: 357 NG/ML — SIGNIFICANT CHANGE UP (ref 30–400)
GLUCOSE SERPL-MCNC: 100 MG/DL — HIGH (ref 70–99)
HCT VFR BLD CALC: 30.2 % — LOW (ref 39–50)
HGB BLD-MCNC: 9.6 G/DL — LOW (ref 13–17)
IMM GRANULOCYTES NFR BLD AUTO: 0.2 % — SIGNIFICANT CHANGE UP (ref 0–1.5)
IRON SATN MFR SERPL: 17 UG/DL — LOW (ref 59–158)
IRON SATN MFR SERPL: 8 % — LOW (ref 16–55)
LYMPHOCYTES # BLD AUTO: 0.7 K/UL — LOW (ref 1–3.3)
LYMPHOCYTES # BLD AUTO: 13.9 % — SIGNIFICANT CHANGE UP (ref 13–44)
MAGNESIUM SERPL-MCNC: 1.9 MG/DL — SIGNIFICANT CHANGE UP (ref 1.6–2.6)
MCHC RBC-ENTMCNC: 30.6 PG — SIGNIFICANT CHANGE UP (ref 27–34)
MCHC RBC-ENTMCNC: 31.8 GM/DL — LOW (ref 32–36)
MCV RBC AUTO: 96.2 FL — SIGNIFICANT CHANGE UP (ref 80–100)
MONOCYTES # BLD AUTO: 0.69 K/UL — SIGNIFICANT CHANGE UP (ref 0–0.9)
MONOCYTES NFR BLD AUTO: 13.7 % — SIGNIFICANT CHANGE UP (ref 2–14)
NEUTROPHILS # BLD AUTO: 3.56 K/UL — SIGNIFICANT CHANGE UP (ref 1.8–7.4)
NEUTROPHILS NFR BLD AUTO: 70.4 % — SIGNIFICANT CHANGE UP (ref 43–77)
PHOSPHATE SERPL-MCNC: 2.8 MG/DL — SIGNIFICANT CHANGE UP (ref 2.4–4.7)
PLATELET # BLD AUTO: 235 K/UL — SIGNIFICANT CHANGE UP (ref 150–400)
POTASSIUM SERPL-MCNC: 3.5 MMOL/L — SIGNIFICANT CHANGE UP (ref 3.5–5.3)
POTASSIUM SERPL-SCNC: 3.5 MMOL/L — SIGNIFICANT CHANGE UP (ref 3.5–5.3)
RBC # BLD: 3.14 M/UL — LOW (ref 4.2–5.8)
RBC # FLD: 15.4 % — HIGH (ref 10.3–14.5)
SODIUM SERPL-SCNC: 135 MMOL/L — SIGNIFICANT CHANGE UP (ref 135–145)
TIBC SERPL-MCNC: 207 UG/DL — LOW (ref 220–430)
TRANSFERRIN SERPL-MCNC: 145 MG/DL — LOW (ref 180–329)
WBC # BLD: 5.05 K/UL — SIGNIFICANT CHANGE UP (ref 3.8–10.5)
WBC # FLD AUTO: 5.05 K/UL — SIGNIFICANT CHANGE UP (ref 3.8–10.5)

## 2020-10-12 PROCEDURE — 99223 1ST HOSP IP/OBS HIGH 75: CPT

## 2020-10-12 PROCEDURE — 99233 SBSQ HOSP IP/OBS HIGH 50: CPT

## 2020-10-12 PROCEDURE — 71045 X-RAY EXAM CHEST 1 VIEW: CPT | Mod: 26

## 2020-10-12 RX ORDER — PHENOBARBITAL 60 MG
2 TABLET ORAL
Qty: 0 | Refills: 0 | DISCHARGE

## 2020-10-12 RX ORDER — IRON SUCROSE 20 MG/ML
200 INJECTION, SOLUTION INTRAVENOUS EVERY 24 HOURS
Refills: 0 | Status: DISCONTINUED | OUTPATIENT
Start: 2020-10-12 | End: 2020-10-14

## 2020-10-12 RX ORDER — LORATADINE 10 MG/1
1 TABLET ORAL
Qty: 0 | Refills: 0 | DISCHARGE

## 2020-10-12 RX ORDER — METOPROLOL TARTRATE 50 MG
50 TABLET ORAL EVERY 8 HOURS
Refills: 0 | Status: DISCONTINUED | OUTPATIENT
Start: 2020-10-12 | End: 2020-10-15

## 2020-10-12 RX ORDER — CEFTRIAXONE 500 MG/1
1000 INJECTION, POWDER, FOR SOLUTION INTRAMUSCULAR; INTRAVENOUS EVERY 24 HOURS
Refills: 0 | Status: DISCONTINUED | OUTPATIENT
Start: 2020-10-12 | End: 2020-10-14

## 2020-10-12 RX ORDER — CHLORHEXIDINE GLUCONATE 213 G/1000ML
1 SOLUTION TOPICAL
Refills: 0 | Status: DISCONTINUED | OUTPATIENT
Start: 2020-10-12 | End: 2020-10-15

## 2020-10-12 RX ORDER — ASPIRIN/CALCIUM CARB/MAGNESIUM 324 MG
1 TABLET ORAL
Qty: 0 | Refills: 0 | DISCHARGE

## 2020-10-12 RX ADMIN — LORATADINE 10 MILLIGRAM(S): 10 TABLET ORAL at 11:00

## 2020-10-12 RX ADMIN — Medication 81 MILLIGRAM(S): at 11:00

## 2020-10-12 RX ADMIN — HEPARIN SODIUM 5000 UNIT(S): 5000 INJECTION INTRAVENOUS; SUBCUTANEOUS at 05:21

## 2020-10-12 RX ADMIN — Medication 64.8 MILLIGRAM(S): at 16:37

## 2020-10-12 RX ADMIN — Medication 1 TABLET(S): at 08:11

## 2020-10-12 RX ADMIN — Medication 1 TABLET(S): at 11:00

## 2020-10-12 RX ADMIN — CHLORHEXIDINE GLUCONATE 1 APPLICATION(S): 213 SOLUTION TOPICAL at 08:44

## 2020-10-12 RX ADMIN — MUPIROCIN 1 APPLICATION(S): 20 OINTMENT TOPICAL at 06:56

## 2020-10-12 RX ADMIN — Medication 64.8 MILLIGRAM(S): at 05:22

## 2020-10-12 RX ADMIN — LEVETIRACETAM 750 MILLIGRAM(S): 250 TABLET, FILM COATED ORAL at 16:32

## 2020-10-12 RX ADMIN — MAGNESIUM OXIDE 400 MG ORAL TABLET 400 MILLIGRAM(S): 241.3 TABLET ORAL at 11:00

## 2020-10-12 RX ADMIN — PANTOPRAZOLE SODIUM 40 MILLIGRAM(S): 20 TABLET, DELAYED RELEASE ORAL at 11:00

## 2020-10-12 RX ADMIN — Medication 1 TABLET(S): at 05:22

## 2020-10-12 RX ADMIN — DIVALPROEX SODIUM 500 MILLIGRAM(S): 500 TABLET, DELAYED RELEASE ORAL at 05:22

## 2020-10-12 RX ADMIN — Medication 50 MILLIGRAM(S): at 13:02

## 2020-10-12 RX ADMIN — MUPIROCIN 1 APPLICATION(S): 20 OINTMENT TOPICAL at 16:33

## 2020-10-12 RX ADMIN — MAGNESIUM OXIDE 400 MG ORAL TABLET 400 MILLIGRAM(S): 241.3 TABLET ORAL at 08:11

## 2020-10-12 RX ADMIN — Medication 1 TABLET(S): at 16:31

## 2020-10-12 RX ADMIN — Medication 500 MILLIGRAM(S): at 11:00

## 2020-10-12 RX ADMIN — Medication 1 TABLET(S): at 21:35

## 2020-10-12 RX ADMIN — CEFTRIAXONE 100 MILLIGRAM(S): 500 INJECTION, POWDER, FOR SOLUTION INTRAMUSCULAR; INTRAVENOUS at 08:44

## 2020-10-12 RX ADMIN — Medication 1 TABLET(S): at 16:34

## 2020-10-12 RX ADMIN — MAGNESIUM OXIDE 400 MG ORAL TABLET 400 MILLIGRAM(S): 241.3 TABLET ORAL at 16:31

## 2020-10-12 RX ADMIN — PIPERACILLIN AND TAZOBACTAM 25 GRAM(S): 4; .5 INJECTION, POWDER, LYOPHILIZED, FOR SOLUTION INTRAVENOUS at 05:22

## 2020-10-12 RX ADMIN — LEVETIRACETAM 750 MILLIGRAM(S): 250 TABLET, FILM COATED ORAL at 05:21

## 2020-10-12 RX ADMIN — CLOPIDOGREL BISULFATE 75 MILLIGRAM(S): 75 TABLET, FILM COATED ORAL at 11:00

## 2020-10-12 RX ADMIN — Medication 50 MILLIGRAM(S): at 05:22

## 2020-10-12 RX ADMIN — IRON SUCROSE 110 MILLIGRAM(S): 20 INJECTION, SOLUTION INTRAVENOUS at 16:28

## 2020-10-12 RX ADMIN — DIVALPROEX SODIUM 500 MILLIGRAM(S): 500 TABLET, DELAYED RELEASE ORAL at 16:31

## 2020-10-12 RX ADMIN — Medication 1 TABLET(S): at 13:02

## 2020-10-12 RX ADMIN — Medication 1 APPLICATION(S): at 11:01

## 2020-10-12 RX ADMIN — SERTRALINE 100 MILLIGRAM(S): 25 TABLET, FILM COATED ORAL at 11:00

## 2020-10-12 RX ADMIN — HEPARIN SODIUM 5000 UNIT(S): 5000 INJECTION INTRAVENOUS; SUBCUTANEOUS at 16:31

## 2020-10-12 RX ADMIN — Medication 225 MICROGRAM(S): at 05:22

## 2020-10-12 NOTE — SWALLOW BEDSIDE ASSESSMENT ADULT - SWALLOW EVAL: DIAGNOSIS
Limited assessment 2* pt's reduced cooperative/participation. Oral and pharyngeal stages of swallow appear functional for thin liquids. Unable to assess additional consistencies 2* pt refusal

## 2020-10-12 NOTE — PROGRESS NOTE ADULT - SUBJECTIVE AND OBJECTIVE BOX
Fever due to unspecified condition    HPI:  66 y/o M w/ a PMH of MR, HTN, bipolar disease, seizure disorder was BIBA from [facility] after being found to have a fever x 1 day.  Pt is a poor historian, lethargic and information was obtained from chart.  Facility reported pt had fever of 102.4 and hypoxia to 82%, placed on 2L NC and improved to >90%.   At baseline pt is wheelchair bound, verbal and oriented to person and place.  Of note, pt has a chronic buckley and PEG.   (09 Oct 2020 17:39)    Interval History:  Patient was seen and examined at bedside around 9:30 am. Had fever 102 last night.   No active complaints. Does not want to eat.  Denies chest pain, shortness of breath, abdominal pain, nausea or vomiting.     ROS:  As per interval history otherwise unremarkable.    PHYSICAL EXAM:  Vital Signs   T(C): 37.2 (12 Oct 2020 08:17), Max: 38.9 (11 Oct 2020 20:42)  T(F): 98.9 (12 Oct 2020 08:17), Max: 102 (11 Oct 2020 20:42)  HR: 108 (12 Oct 2020 08:17) (105 - 116)  BP: 110/75 (12 Oct 2020 08:17) (110/75 - 147/82)  RR: 18 (12 Oct 2020 08:17) (18 - 20)  SpO2: 97% (12 Oct 2020 05:18) (94% - 97%)  General: Elderly male lying in bed comfortably. No acute distress  HEENT: EOMI. Clear conjunctivae. Moist mucus membrane  Neck: Supple.   Chest: CTA bilaterally. No wheezing, rales or rhonchi. N  Heart: Normal S1 & S2. RRR.   Abdomen: Soft. Non-tender. Non-distended. + BS. PEG in place.   : Buckley's catheter in place  Ext: 1+ pedal edema. No calf tenderness   Neuro: Awake   Skin: Warm and Dry  Psychiatry: Normal mood and affect     MEDICATIONS  (STANDING):  ascorbic acid 500 milliGRAM(s) Oral daily  aspirin  chewable 81 milliGRAM(s) Enteral Tube daily  cefTRIAXone   IVPB 1000 milliGRAM(s) IV Intermittent every 24 hours  chlorhexidine 2% Cloths 1 Application(s) Topical <User Schedule>  clopidogrel Tablet 75 milliGRAM(s) Oral daily  collagenase Ointment 1 Application(s) Topical daily  diVALproex Sprinkle 500 milliGRAM(s) Oral two times a day  heparin   Injectable 5000 Unit(s) SubCutaneous every 12 hours  lactobacillus acidophilus 1 Tablet(s) Oral two times a day  levETIRAcetam  Solution 750 milliGRAM(s) Oral two times a day  levothyroxine 225 MICROGram(s) Oral daily  loratadine 10 milliGRAM(s) Oral daily  magnesium oxide 400 milliGRAM(s) Oral three times a day with meals  metoprolol tartrate 50 milliGRAM(s) Oral every 8 hours  multivitamin 1 Tablet(s) Oral daily  mupirocin 2% Ointment 1 Application(s) Topical two times a day  pantoprazole   Suspension 40 milliGRAM(s) Oral daily  PHENobarbital 64.8 milliGRAM(s) Oral two times a day  potassium phosphate / sodium phosphate Tablet (K-PHOS No. 2) 1 Tablet(s) Oral four times a day with meals  sertraline 100 milliGRAM(s) Oral daily  tamsulosin 0.4 milliGRAM(s) Oral at bedtime    MEDICATIONS  (PRN):  acetaminophen    Suspension .. 650 milliGRAM(s) Oral every 6 hours PRN Temp greater or equal to 38C (100.4F), Mild Pain (1 - 3), Moderate Pain (4 - 6)    LABS:                        9.6    5.05  )-----------( 235      ( 12 Oct 2020 08:45 )             30.2     10-12    135  |  97<L>  |  5.0<L>  ----------------------------<  100<H>  3.5   |  25.0  |  0.32<L>    Ca    8.9      12 Oct 2020 08:45  Phos  2.8     10-12  Mg     1.9     10-12    Blood Culture: 10-09 @ 22:17  Organism --  Gram Stain Blood -- Gram Stain --  Specimen Source .Blood Blood  Culture-Blood --    10-09 @ 21:58  Organism Providencia stuartii  Gram Stain Blood -- Gram Stain --  Specimen Source .Urine Clean Catch (Midstream)  Culture-Blood --    10-09 @ 16:20  Organism --  Gram Stain Blood -- Gram Stain --  Specimen Source .Blood Blood-Peripheral  Culture-Blood --    10-09 @ 16:19  Organism --  Gram Stain Blood -- Gram Stain --  Specimen Source .Blood Blood-Peripheral  Culture-Blood --    RADIOLOGY & ADDITIONAL STUDIES:  Reviewed

## 2020-10-12 NOTE — REVIEW OF SYSTEMS
[Feeling Fatigued] : feeling fatigued [Negative] : Heme/Lymph [Fever] : no fever [Chills] : no chills [Shortness Of Breath] : no shortness of breath [Dyspnea on exertion] : not dyspnea during exertion [Chest Pain] : no chest pain [Lower Ext Edema] : no extremity edema [Palpitations] : no palpitations

## 2020-10-12 NOTE — SWALLOW BEDSIDE ASSESSMENT ADULT - SLP PERTINENT HISTORY OF CURRENT PROBLEM
66 y/o M w/ a PMH of MR, HTN, bipolar disease, seizure disorder was BIBA from [facility] after being found to have a fever x 1 day.  Pt is a poor historian, lethargic and information was obtained from chart.  Facility reported pt had fever of 102.4 and hypoxia to 82%, placed on 2L NC and improved to >90%.   At baseline pt is wheelchair bound, verbal and oriented to person and place.  Of note, pt has a chronic buckley and PEG. Contacted [facility] and spoke with SHOSHANA Grimm to determine baseline diet information. Sirisha reports pt receiving primarily water bolus and liquid medication via PEG. He has been on a ground/thin liquid diet, however reportedly often refuses meals and is very picky. He reportedly likes "sweet things like chocolate," ground eggs and toast, puddings, and yogurts. The facility has been attempting 6 smaller meals in an effort to improve PO intake and reports at least 3 Ensure+ daily, with a maximum of 6 when he refuses to eat meals.

## 2020-10-12 NOTE — SWALLOW BEDSIDE ASSESSMENT ADULT - CONSISTENCIES ADMINISTERED
puree/one one trial administered as pt refused additional trials despite maximal encouragement thin liquid/7 oz.

## 2020-10-12 NOTE — ADDENDUM
[FreeTextEntry1] : I was present for the above evaluation and agree with the history, physical exam, assessment and plan as above. Doing well at this point. will stop plavix and continue ASA 325mg qd alone. Will get CT to reevaluate watchman device 1 yr post implant given complications associated with HEIDI in past.

## 2020-10-12 NOTE — DISCUSSION/SUMMARY
[FreeTextEntry1] : 63 year old gentleman with history of mental retardation, HTN, right cervical carotid pseudoaneurysm, prior stroke, atrial flutter and PAF on ILR (CHADSVASc score of 3 - HTN, prior stroke), with recurrent falls, unstable gait, and cognitive dysfunction, considered at high risk for long-term anticoagulation s/p successful LAAO with WATCHMAN implant.  Remained on short term Eliquis post WATCHMAN w/o bleeding complications. HEIDI > 45 days post implant showed stable device placement without significant leak.  Eliquis was d/c and started on Plavix and ASA. \par Post procedure course c/b hospitalization at [facility] for esophageal tear requiring blood transfusions. Was hospitalized for several weeks weeks prior to discharge to rehab facility.  During this period, had increased frequency of PAF with RVR and metoprolol was temporarily increased.  Now doing well on Metoprolol 50mg daily with NO recurrent PAF in the past 4-5 months.   Tolerating ASA and Plavix without bleeding complications.  \par \par - D/c Plavix. Increase to full dose ASA EC 325mg\par - Plan for cardiac CT at 1 year post LAAO to assess WATCHMAN placement (approx. 2/2020)\par - Continue Metoprolol 50mg daily \par - Ongoing remote monitoring of ILR\par \par D/w Dr. Ivy. \par Cardiac CT as above.  EP f/up as needed\par \par Courtney Agosto PAC\par

## 2020-10-12 NOTE — PROGRESS NOTE ADULT - ASSESSMENT
65 years old male with PMH of MR, Seizure Disorder, CVA, A. Flutter s/p WATCHMAN, Hypothyroidism and Indwelling Garcia's Catheter presented from [facility] with fever and hypoxia,     1) Sepsis secondary to UTI  - Has chronic indwelling Garcia's catheter, changed in ER  - Urine culture with Providencia and Proteus  - Blood cultures negative   - Zosyn was switched to Rocephin in am  - ID Consult appreciated: recommending repeat blood cultures and chest x-ray     2) Acute Respiratory Failure with Hypoxia  - Likely due to Sepsis  - No PE or Pneumonia on CTA Chest   - Improved        3) Decubitus Ulcers  - Present on admission  - Patient is refusing exam. As per documents: Stage 2 left gluteal and right heel pressure ulcers   - Wound care consult  - Frequent repositioning   - Heel Off    - Air fluidized bed     4) History of CVA  - Continue Aspirin and Plavix   - Lipitor was discontinued in August 2020 as per records     5) Chronic A. Flutter  - s/p WATCHMAN  - Continue Plavix   - Resume Metoprolol  - Will resume Cardizem if BP remains stable     6) Hypothyroidism  - Continue Synthroid    7) Seizure Disorder   - Continue Keppra, Phenobarb and Divalproex     8) Dysphagia  - Speech therapy recommending thin liquids only. Will continue Tube feeding via PEG as patient is refusing to eat.     9) Anemia  - FOBT negative  - Low iron stores, start Venofer 200 mg x 5    10) Hypokalemia / Hypophosphatemia  - Repleted     11) Hyponatremia  - Resolved    DVT Prophylaxis -- Heparin SQ    Dispo: [facility] likely on Thursday.

## 2020-10-12 NOTE — REASON FOR VISIT
[Follow-Up - Clinic] : a clinic follow-up of [Atrial Fibrillation] : atrial fibrillation [FreeTextEntry1] : s/p LAAO with WATCHMAN

## 2020-10-12 NOTE — CONSULT NOTE ADULT - SUBJECTIVE AND OBJECTIVE BOX
INFECTIOUS DISEASES AND INTERNAL MEDICINE at [place]  =======================================================  Stanton Montanez MD  Diplomates American Board of Internal Medicine and Infectious Diseases  Telephone [phone]  Fax            [phone]  =======================================================    GONZALES [id number]      HPI:  66 y/o M w/ a PMH of MR, HTN, bipolar disease, seizure disorder was BIBA from [facility] after being found to have a fever x 1 day.  Pt is a poor historian, lethargic and information was obtained from chart.  Facility reported pt had fever of 102.4 and hypoxia to 82%, placed on 2L NC and improved to >90%.   At baseline pt is wheelchair bound, verbal and oriented to person and place.  Of note, pt has a chronic buckley and PEG.      PT WITH TWO ORGANISMS  IN URINE CX AND PLACED ON IV ABX   PT NOW  WITH RECURRENT FEVERS   ASKED TO EVALUATE FROM ID STANDPOINT     PAST MEDICAL & SURGICAL HISTORY:  Seizure disorder    Hypothyroid    PVC (premature ventricular contraction)  s/p pvc ablation by Dr Barrientos 2/2016    Cardiomyopathy  recovered LV function    Atrial flutter  in setting of sepsis    CVA (cerebral vascular accident)    Pseudoaneurysm  right cerviacl carotid    Bipolar disorder    HTN (hypertension)    Mental retardation    PEG (percutaneous endoscopic gastrostomy) status    Presence of Watchman left atrial appendage closure device  feb  15  2019    History of loop recorder  june 2018  [facility]  dr Ivy    H/O tracheostomy  successful removal    S/P tonsillectomy    Bilateral cataracts  s/p removal    H/O cardiac radiofrequency ablation  PVC-Dr Barrientos 2/2016        ANTIBIOTICS  cefTRIAXone   IVPB 1000 milliGRAM(s) IV Intermittent every 24 hours      Allergies    codeine (Unknown)    Intolerances        SOCIAL HISTORY:     FAMILY HX   FAMILY HISTORY:      Vital Signs Last 24 Hrs  T(C): 37.2 (12 Oct 2020 08:17), Max: 38.9 (11 Oct 2020 20:42)  T(F): 98.9 (12 Oct 2020 08:17), Max: 102 (11 Oct 2020 20:42)  HR: 108 (12 Oct 2020 08:17) (105 - 116)  BP: 110/75 (12 Oct 2020 08:17) (110/75 - 147/82)  BP(mean): --  RR: 18 (12 Oct 2020 08:17) (18 - 20)  SpO2: 97% (12 Oct 2020 05:18) (94% - 97%)  Drug Dosing Weight  Height (cm): 167.6 (09 Oct 2020 14:01)  Weight (kg): 66.224 (08 Apr 2019 09:32)  BMI (kg/m2): 23.6 (09 Oct 2020 14:01)  BSA (m2): 1.75 (09 Oct 2020 14:01)      REVIEW OF SYSTEMS:   UNABLE TO OBTAIN     SKIN:  No change in skin, hair or nails.    NEUROLOGIC:   AS  PER HPI                   PHYSICAL EXAMINATION:    GENERAL: The patient is a well-developed,  ___in no apparent distress. ___ is alert a . CONTRACTED     VITAL SIGNS: T(C): 37.2 (10-12-20 @ 08:17), Max: 38.9 (10-11-20 @ 20:42)  HR: 108 (10-12-20 @ 08:17) (105 - 116)  BP: 110/75 (10-12-20 @ 08:17) (110/75 - 147/82)  RR: 18 (10-12-20 @ 08:17) (18 - 20)  SpO2: 97% (10-12-20 @ 05:18) (94% - 97%)  Wt(kg): --    HEENT: Head is normocephalic and atraumatic.  ANICTERIC  NECK: Supple. No carotid bruits.  No lymphadenopathy or thyromegaly.    LUNGS:COARSE BREATH SOUNDS    HEART: Regular rate and rhythm without murmur.    ABDOMEN: Soft, nontender, and nondistended.  Positive bowel sounds.  No hepatosplenomegaly was noted. NO REBOUND NO GUARDING    EXTREMITIES: NO EDEMA NO ERYTHEMA    NEUROLOGIC: CONTRACTED       SKIN: No ulceration or induration present. NO RASH        BLOOD CULTURES  Culture Results:   No growth at 48 hours (10-09 @ 22:17)  Culture Results:   No growth at 48 hours (10-09 @ 22:17)  Culture Results:   >100,000 CFU/ml Providencia stuartii  >100,000 CFU/ml Proteus mirabilis (10-09 @ 21:58)  Culture Results:   No growth at 48 hours (10-09 @ 16:20)  Culture Results:   No growth at 48 hours (10-09 @ 16:19)       URINE CX          LABS:                        9.6    5.05  )-----------( 235      ( 12 Oct 2020 08:45 )             30.2     10-12    135  |  97<L>  |  5.0<L>  ----------------------------<  100<H>  3.5   |  25.0  |  0.32<L>    Ca    8.9      12 Oct 2020 08:45  Phos  2.8     10-12  Mg     1.9     10-12            RADIOLOGY & ADDITIONAL STUDIES:      ASSESSMENT/PLAN    66 y/o M w/ a PMH of MR, HTN, bipolar disease, seizure disorder was BIBA from [facility] after being found to have a fever x 1 day.  Pt is a poor historian, lethargic and information was obtained from chart.  Facility reported pt had fever of 102.4 and hypoxia to 82%, placed on 2L NC and improved to >90%.   At baseline pt is wheelchair bound, verbal and oriented to person and place.  Of note, pt has a chronic buckley and PEG.      PT WITH TWO ORGANISMS  IN URINE CX AND PLACED ON IV  ABX   PT NOW  WITH RECURRENT FEVERS   ON ROCEPHIN CAN CONTINUE FOR NOW   WOULD REPEAT BLOOD  CX X 2  AND CXR  PT REPORTED WITH HEEL  AND THIGH DECUBITUS ULCER UNABLE TO ASSESS AT THIS TIME BUT BY REPORT DID NOT APPEAR INFECTED   IF FEVERS CONTINUE WILL NEED TO EVALUATE ULCERS FURTHER  WILL D/W HOSPITALIST                SILVANO NELSON MD

## 2020-10-12 NOTE — SWALLOW BEDSIDE ASSESSMENT ADULT - ASR SWALLOW ASPIRATION MONITOR
gurgly voice/upper respiratory infection/oral hygiene/change of breathing pattern/position upright (90Y)/cough/fever/pneumonia/throat clearing

## 2020-10-13 LAB
ANION GAP SERPL CALC-SCNC: 13 MMOL/L — SIGNIFICANT CHANGE UP (ref 5–17)
BUN SERPL-MCNC: 6 MG/DL — LOW (ref 8–20)
CALCIUM SERPL-MCNC: 8.4 MG/DL — LOW (ref 8.6–10.2)
CHLORIDE SERPL-SCNC: 94 MMOL/L — LOW (ref 98–107)
CO2 SERPL-SCNC: 25 MMOL/L — SIGNIFICANT CHANGE UP (ref 22–29)
CREAT SERPL-MCNC: 0.28 MG/DL — LOW (ref 0.5–1.3)
GLUCOSE SERPL-MCNC: 104 MG/DL — HIGH (ref 70–99)
MAGNESIUM SERPL-MCNC: 1.7 MG/DL — SIGNIFICANT CHANGE UP (ref 1.6–2.6)
MRSA PCR RESULT.: DETECTED
POTASSIUM SERPL-MCNC: 3.3 MMOL/L — LOW (ref 3.5–5.3)
POTASSIUM SERPL-SCNC: 3.3 MMOL/L — LOW (ref 3.5–5.3)
S AUREUS DNA NOSE QL NAA+PROBE: DETECTED
SODIUM SERPL-SCNC: 132 MMOL/L — LOW (ref 135–145)

## 2020-10-13 PROCEDURE — 99232 SBSQ HOSP IP/OBS MODERATE 35: CPT | Mod: GC

## 2020-10-13 PROCEDURE — 99232 SBSQ HOSP IP/OBS MODERATE 35: CPT

## 2020-10-13 RX ORDER — POTASSIUM CHLORIDE 20 MEQ
40 PACKET (EA) ORAL EVERY 4 HOURS
Refills: 0 | Status: COMPLETED | OUTPATIENT
Start: 2020-10-13 | End: 2020-10-13

## 2020-10-13 RX ORDER — MUPIROCIN 20 MG/G
1 OINTMENT TOPICAL
Refills: 0 | Status: DISCONTINUED | OUTPATIENT
Start: 2020-10-13 | End: 2020-10-14

## 2020-10-13 RX ADMIN — LEVETIRACETAM 750 MILLIGRAM(S): 250 TABLET, FILM COATED ORAL at 17:57

## 2020-10-13 RX ADMIN — CLOPIDOGREL BISULFATE 75 MILLIGRAM(S): 75 TABLET, FILM COATED ORAL at 11:38

## 2020-10-13 RX ADMIN — Medication 64.8 MILLIGRAM(S): at 18:10

## 2020-10-13 RX ADMIN — MAGNESIUM OXIDE 400 MG ORAL TABLET 400 MILLIGRAM(S): 241.3 TABLET ORAL at 11:38

## 2020-10-13 RX ADMIN — Medication 50 MILLIGRAM(S): at 05:37

## 2020-10-13 RX ADMIN — Medication 225 MICROGRAM(S): at 05:38

## 2020-10-13 RX ADMIN — HEPARIN SODIUM 5000 UNIT(S): 5000 INJECTION INTRAVENOUS; SUBCUTANEOUS at 17:57

## 2020-10-13 RX ADMIN — Medication 40 MILLIEQUIVALENT(S): at 13:23

## 2020-10-13 RX ADMIN — MUPIROCIN 1 APPLICATION(S): 20 OINTMENT TOPICAL at 17:58

## 2020-10-13 RX ADMIN — HEPARIN SODIUM 5000 UNIT(S): 5000 INJECTION INTRAVENOUS; SUBCUTANEOUS at 05:38

## 2020-10-13 RX ADMIN — Medication 1 TABLET(S): at 11:38

## 2020-10-13 RX ADMIN — MAGNESIUM OXIDE 400 MG ORAL TABLET 400 MILLIGRAM(S): 241.3 TABLET ORAL at 08:08

## 2020-10-13 RX ADMIN — Medication 64.8 MILLIGRAM(S): at 05:37

## 2020-10-13 RX ADMIN — Medication 1 TABLET(S): at 05:37

## 2020-10-13 RX ADMIN — MAGNESIUM OXIDE 400 MG ORAL TABLET 400 MILLIGRAM(S): 241.3 TABLET ORAL at 17:57

## 2020-10-13 RX ADMIN — Medication 50 MILLIGRAM(S): at 21:48

## 2020-10-13 RX ADMIN — Medication 500 MILLIGRAM(S): at 11:38

## 2020-10-13 RX ADMIN — Medication 1 TABLET(S): at 08:08

## 2020-10-13 RX ADMIN — MUPIROCIN 1 APPLICATION(S): 20 OINTMENT TOPICAL at 05:38

## 2020-10-13 RX ADMIN — Medication 50 MILLIGRAM(S): at 13:23

## 2020-10-13 RX ADMIN — DIVALPROEX SODIUM 500 MILLIGRAM(S): 500 TABLET, DELAYED RELEASE ORAL at 05:38

## 2020-10-13 RX ADMIN — CEFTRIAXONE 100 MILLIGRAM(S): 500 INJECTION, POWDER, FOR SOLUTION INTRAMUSCULAR; INTRAVENOUS at 08:08

## 2020-10-13 RX ADMIN — IRON SUCROSE 110 MILLIGRAM(S): 20 INJECTION, SOLUTION INTRAVENOUS at 17:01

## 2020-10-13 RX ADMIN — Medication 1 APPLICATION(S): at 11:38

## 2020-10-13 RX ADMIN — Medication 40 MILLIEQUIVALENT(S): at 18:10

## 2020-10-13 RX ADMIN — DIVALPROEX SODIUM 500 MILLIGRAM(S): 500 TABLET, DELAYED RELEASE ORAL at 17:57

## 2020-10-13 RX ADMIN — LORATADINE 10 MILLIGRAM(S): 10 TABLET ORAL at 11:38

## 2020-10-13 RX ADMIN — Medication 81 MILLIGRAM(S): at 11:38

## 2020-10-13 RX ADMIN — LEVETIRACETAM 750 MILLIGRAM(S): 250 TABLET, FILM COATED ORAL at 05:38

## 2020-10-13 RX ADMIN — CHLORHEXIDINE GLUCONATE 1 APPLICATION(S): 213 SOLUTION TOPICAL at 05:38

## 2020-10-13 RX ADMIN — PANTOPRAZOLE SODIUM 40 MILLIGRAM(S): 20 TABLET, DELAYED RELEASE ORAL at 11:38

## 2020-10-13 RX ADMIN — SERTRALINE 100 MILLIGRAM(S): 25 TABLET, FILM COATED ORAL at 11:38

## 2020-10-13 RX ADMIN — Medication 1 TABLET(S): at 17:57

## 2020-10-13 NOTE — PROGRESS NOTE ADULT - SUBJECTIVE AND OBJECTIVE BOX
CC: sepsis/UTI    INTERVAL HPI/OVERNIGHT EVENTS: Patient seen and examined. Still refusing to take anything oral. States "I am going home tomorrow".  Appears comfortable.     Vital Signs Last 24 Hrs  T(C): 36.8 (13 Oct 2020 07:25), Max: 37.2 (13 Oct 2020 00:08)  T(F): 98.2 (13 Oct 2020 07:25), Max: 98.9 (13 Oct 2020 00:08)  HR: 76 (13 Oct 2020 13:22) (67 - 104)  BP: 114/67 (13 Oct 2020 13:22) (98/64 - 129/69)  BP(mean): --  RR: 18 (13 Oct 2020 07:25) (18 - 18)  SpO2: 96% (13 Oct 2020 07:25) (94% - 96%)    ROS:  Limited due to patient's lack of cooperation    PE:  General:  No acute distress  HEENT: EOMI. Clear conjunctivae. Moist mucus membrane  Neck: Supple.   Chest: CTA bilaterally. No wheezing, rales or rhonchi. N  Heart: Normal S1 & S2. RRR.   Abdomen: Soft. Non-tender. Non-distended. + BS. PEG in place.   : Garcia's catheter in place  Ext: 1+ pedal edema. No calf tenderness   Neuro: Awake, non focal  Psychiatry: Flat affect  I&O's Detail    12 Oct 2020 07:01  -  13 Oct 2020 07:00  --------------------------------------------------------  IN:    Free Water: 1000 mL    Jevity 1.5: 1320 mL  Total IN: 2320 mL    OUT:    Indwelling Catheter - Urethral (mL): 2075 mL  Total OUT: 2075 mL    Total NET: 245 mL      13 Oct 2020 07:01  -  13 Oct 2020 13:26  --------------------------------------------------------  IN:    Free Water: 150 mL    IV PiggyBack: 50 mL    Jevity 1.5: 300 mL  Total IN: 500 mL    OUT:  Total OUT: 0 mL    Total NET: 500 mL                                    9.6    5.05  )-----------( 235      ( 12 Oct 2020 08:45 )             30.2     13 Oct 2020 08:01    132    |  94     |  6.0    ----------------------------<  104    3.3     |  25.0   |  0.28     Ca    8.4        13 Oct 2020 08:01  Phos  2.8       12 Oct 2020 08:45  Mg     1.7       13 Oct 2020 08:01        CAPILLARY BLOOD GLUCOSE              MEDICATIONS  (STANDING):  ascorbic acid 500 milliGRAM(s) Oral daily  aspirin  chewable 81 milliGRAM(s) Enteral Tube daily  cefTRIAXone   IVPB 1000 milliGRAM(s) IV Intermittent every 24 hours  chlorhexidine 2% Cloths 1 Application(s) Topical <User Schedule>  clopidogrel Tablet 75 milliGRAM(s) Oral daily  collagenase Ointment 1 Application(s) Topical daily  diVALproex Sprinkle 500 milliGRAM(s) Oral two times a day  heparin   Injectable 5000 Unit(s) SubCutaneous every 12 hours  iron sucrose IVPB 200 milliGRAM(s) IV Intermittent every 24 hours  lactobacillus acidophilus 1 Tablet(s) Oral two times a day  levETIRAcetam  Solution 750 milliGRAM(s) Oral two times a day  levothyroxine 225 MICROGram(s) Oral daily  loratadine 10 milliGRAM(s) Oral daily  magnesium oxide 400 milliGRAM(s) Oral three times a day with meals  metoprolol tartrate 50 milliGRAM(s) Oral every 8 hours  multivitamin 1 Tablet(s) Oral daily  mupirocin 2% Ointment 1 Application(s) Topical two times a day  mupirocin 2% Ointment 1 Application(s) Both Nostrils two times a day  pantoprazole   Suspension 40 milliGRAM(s) Oral daily  PHENobarbital 64.8 milliGRAM(s) Oral two times a day  potassium chloride   Powder 40 milliEquivalent(s) Oral every 4 hours  sertraline 100 milliGRAM(s) Oral daily  tamsulosin 0.4 milliGRAM(s) Oral at bedtime    MEDICATIONS  (PRN):  acetaminophen    Suspension .. 650 milliGRAM(s) Oral every 6 hours PRN Temp greater or equal to 38C (100.4F), Mild Pain (1 - 3), Moderate Pain (4 - 6)      RADIOLOGY & ADDITIONAL TESTS:   CC: sepsis/UTI    INTERVAL HPI/OVERNIGHT EVENTS: Patient seen and examined. Still refusing to take anything oral. States "I am going home tomorrow".  Appears comfortable.     Vital Signs   T(C): 36.8 (13 Oct 2020 07:25), Max: 37.2 (13 Oct 2020 00:08)  T(F): 98.2 (13 Oct 2020 07:25), Max: 98.9 (13 Oct 2020 00:08)  HR: 76 (13 Oct 2020 13:22) (67 - 104)  BP: 114/67 (13 Oct 2020 13:22) (98/64 - 129/69)  RR: 18 (13 Oct 2020 07:25) (18 - 18)  SpO2: 96% (13 Oct 2020 07:25) (94% - 96%)    ROS:  Limited due to patient's lack of cooperation    PE:  General:  No acute distress  HEENT: EOMI. Clear conjunctivae. Moist mucus membrane  Neck: Supple.   Chest: CTA bilaterally. No wheezing, rales or rhonchi. N  Heart: Normal S1 & S2. RRR.   Abdomen: Soft. Non-tender. Non-distended. + BS. PEG in place.   : Garcia's catheter in place  Ext: 1+ pedal edema. No calf tenderness   Neuro: Awake, non focal  Psychiatry: Flat affect  I&O's Detail    12 Oct 2020 07:01  -  13 Oct 2020 07:00  --------------------------------------------------------  IN:    Free Water: 1000 mL    Jevity 1.5: 1320 mL  Total IN: 2320 mL    OUT:    Indwelling Catheter - Urethral (mL): 2075 mL  Total OUT: 2075 mL    Total NET: 245 mL      13 Oct 2020 07:01  -  13 Oct 2020 13:26  --------------------------------------------------------  IN:    Free Water: 150 mL    IV PiggyBack: 50 mL    Jevity 1.5: 300 mL  Total IN: 500 mL    OUT:  Total OUT: 0 mL    Total NET: 500 mL                          9.6    5.05  )-----------( 235      ( 12 Oct 2020 08:45 )             30.2     13 Oct 2020 08:01    132    |  94     |  6.0    ----------------------------<  104    3.3     |  25.0   |  0.28     Ca    8.4        13 Oct 2020 08:01  Phos  2.8       12 Oct 2020 08:45  Mg     1.7       13 Oct 2020 08:01      MEDICATIONS  (STANDING):  ascorbic acid 500 milliGRAM(s) Oral daily  aspirin  chewable 81 milliGRAM(s) Enteral Tube daily  cefTRIAXone   IVPB 1000 milliGRAM(s) IV Intermittent every 24 hours  chlorhexidine 2% Cloths 1 Application(s) Topical <User Schedule>  clopidogrel Tablet 75 milliGRAM(s) Oral daily  collagenase Ointment 1 Application(s) Topical daily  diVALproex Sprinkle 500 milliGRAM(s) Oral two times a day  heparin   Injectable 5000 Unit(s) SubCutaneous every 12 hours  iron sucrose IVPB 200 milliGRAM(s) IV Intermittent every 24 hours  lactobacillus acidophilus 1 Tablet(s) Oral two times a day  levETIRAcetam  Solution 750 milliGRAM(s) Oral two times a day  levothyroxine 225 MICROGram(s) Oral daily  loratadine 10 milliGRAM(s) Oral daily  magnesium oxide 400 milliGRAM(s) Oral three times a day with meals  metoprolol tartrate 50 milliGRAM(s) Oral every 8 hours  multivitamin 1 Tablet(s) Oral daily  mupirocin 2% Ointment 1 Application(s) Topical two times a day  mupirocin 2% Ointment 1 Application(s) Both Nostrils two times a day  pantoprazole   Suspension 40 milliGRAM(s) Oral daily  PHENobarbital 64.8 milliGRAM(s) Oral two times a day  potassium chloride   Powder 40 milliEquivalent(s) Oral every 4 hours  sertraline 100 milliGRAM(s) Oral daily  tamsulosin 0.4 milliGRAM(s) Oral at bedtime    MEDICATIONS  (PRN):  acetaminophen    Suspension .. 650 milliGRAM(s) Oral every 6 hours PRN Temp greater or equal to 38C (100.4F), Mild Pain (1 - 3), Moderate Pain (4 - 6)    RADIOLOGY & ADDITIONAL TESTS:  Reviewed

## 2020-10-13 NOTE — ADVANCED PRACTICE NURSE CONSULT - RECOMMEDATIONS
Follow Nutritional suggestions as per Dietary consult.    Recommendation for Unstageable Pressure Injury to Sacrum:  Daily cleanse wound and periwound with Normal Saline, apply collagenase (Santyl), nickel-coin thickness, to entire base of wound, cover with saline moistened gauze, dry dressing and Tape.    Goal of Care:  Enzymatic debridement    Continue low air loss bed therapy, continue to turn & reposition q2h with Z-sharifa fluidized positioning device, Complete CAIR Heel boots to bilateral feet and single breathable pad, please continue measures to decrease friction/shear/pressure.     Plan discussed with patient’s primary nurse, Stephanie Beltre RN.  Please call wound care service line at [phone], if further assistance is needed.

## 2020-10-13 NOTE — PROGRESS NOTE ADULT - ATTENDING COMMENTS
Patient was seen and examined at bedside around 9:15 am.  Feeling OK. No active complaints.   Refusing to eat, encouraged but he does not want to eat.   Speech therapy unable to asses further as he keeps refusing.  Continue current treatment.  Tube feed changed from continuos to bolus.   Likely discharge tomorrow.

## 2020-10-13 NOTE — PROGRESS NOTE ADULT - ASSESSMENT
66 y/o M w/ a PMH of MR, HTN, bipolar disease, seizure disorder was BIBA from [facility] after being found to have a fever x 1 day.  Pt is a poor historian, lethargic and information was obtained from chart.  Facility reported pt had fever of 102.4 and hypoxia to 82%, placed on 2L NC and improved to >90%.   At baseline pt is wheelchair bound, verbal and oriented to person and place.  Of note, pt has a chronic buckley and PEG.      PT WITH TWO ORGANISMS  IN URINE CX AND PLACED ON IV  ABX   PT    WITH RECURRENT FEVER   BUT NOW  AFEBRILE   ON ROCEPHIN CAN CONTINUE FOR NOW  WOULD COMPLETE TOTAL 7 DAYS OF ABX     REPEAT BLOOD  CX X 2  AND CXR DONE   PT REPORTED WITH HEEL  AND THIGH DECUBITUS ULCER UNABLE TO ASSESS AT THIS TIME BUT BY REPORT DID NOT APPEAR INFECTED    IF REMAINS AFEBRILE POSSIBLE D/C IN AM ON ORAL ABX

## 2020-10-13 NOTE — PROGRESS NOTE ADULT - ASSESSMENT
65 years old male with PMH of MR, Seizure Disorder, CVA, A. Flutter s/p WATCHMAN, Hypothyroidism and Indwelling Garcia's Catheter presented from [facility] with fever and hypoxia,     1) Sepsis secondary to UTI  - Has chronic indwelling Garcia's catheter, changed in ER  - Urine culture with Providencia and Proteus  - Initial Blood cultures negative   - Zosyn was switched to Rocephin, can change to via PEG on DC as per ID  - ID Consult appreciated:  repeat blood cultures drawn on 10/12, chest x-ray: no active chest disease.      2) Acute Respiratory Failure with Hypoxia  - Likely due to Sepsis  - No PE or Pneumonia on CTA Chest   - Improved        3) Decubitus Ulcers  - Present on admission  - Patient is refusing exam. As per documents: Stage 2 left gluteal and right heel pressure ulcers   - Wound care consult  - Frequent repositioning   - Heel Off    - Air fluidized bed     4) History of CVA  - Continue Aspirin and Plavix   - Lipitor was discontinued in August 2020 as per records     5) Chronic A. Flutter  - s/p WATCHMAN  - Continue Plavix   -  Metoprolol for rate control      6) Hypothyroidism  - Continue Synthroid    7) Seizure Disorder   - Continue Keppra, Phenobarb and Divalproex     8) Dysphagia  - Speech therapy recommending thin liquids only. Will continue Tube feeding via PEG as patient is refusing to eat. Appreciate dietician input, feeds changed to bolus feeds, will be discharge on same. Social work has made group home aware.     9) Anemia  - FOBT negative  - Low iron stores,  Venofer 200 mg x 5    10) Hypokalemia / Hypophosphatemia  - Replace as needed    11) Hyponatremia  - Resolved    DVT Prophylaxis -- Heparin SQ    Dispo: [facility] likely tomorrow, pending negative BC results 65 years old male with PMH of MR, Seizure Disorder, CVA, A. Flutter s/p WATCHMAN, Hypothyroidism and Indwelling Garcia's Catheter presented from Group Home with fever and hypoxia,     1) Sepsis secondary to UTI  - Has chronic indwelling Garcia's catheter, changed in ER  - Urine culture with Providencia and Proteus  - Initial Blood cultures negative   - Zosyn was switched to Rocephin, can change to antibiotics via PEG on DC as per ID  - ID Consult appreciated:  repeat blood cultures drawn on 10/12, chest x-ray: no active chest disease.      2) Acute Respiratory Failure with Hypoxia  - Likely due to Sepsis  - No PE or Pneumonia on CTA Chest   - Improved        3) Decubitus Ulcers  - Present on admission  - Patient is refusing exam. As per documents: Stage 2 left gluteal and right heel pressure ulcers   - Wound care consult  - Frequent repositioning   - Heel Off    - Air fluidized bed     4) History of CVA  - Continue Aspirin and Plavix   - Lipitor was discontinued in August 2020 as per records     5) Chronic A. Flutter  - s/p WATCHMAN  - Continue Plavix   -  Metoprolol for rate control    6) Hypothyroidism  - Continue Synthroid    7) Seizure Disorder   - Continue Keppra, Phenobarb and Divalproex     8) Dysphagia  - Speech therapy recommending thin liquids only. Will continue Tube feeding via PEG as patient is refusing to eat. Appreciate dietician input, feeds changed to bolus feeds, will be discharge on same. Social work has made group home aware.     9) Anemia  - FOBT negative  - Low iron stores,  Venofer 200 mg x 5    10) Hypokalemia / Hypophosphatemia  - Replace as needed    11) Hyponatremia  - Resolved    DVT Prophylaxis -- Heparin SQ    Dispo: [facility] [place] likely tomorrow, pending negative BC results

## 2020-10-13 NOTE — PROGRESS NOTE ADULT - SUBJECTIVE AND OBJECTIVE BOX
INFECTIOUS DISEASES AND INTERNAL MEDICINE at [place]  =======================================================  Stanton Montanez MD  Diplomates American Board of Internal Medicine and Infectious Diseases  Telephone [phone]  Fax            [phone]  =======================================================    [facility]OU[facility] [id number]    Follow up: FEVER UTI    Allergies:  codeine (Unknown)      Medications:  acetaminophen    Suspension .. 650 milliGRAM(s) Oral every 6 hours PRN  ascorbic acid 500 milliGRAM(s) Oral daily  aspirin  chewable 81 milliGRAM(s) Enteral Tube daily  cefTRIAXone   IVPB 1000 milliGRAM(s) IV Intermittent every 24 hours  chlorhexidine 2% Cloths 1 Application(s) Topical <User Schedule>  clopidogrel Tablet 75 milliGRAM(s) Oral daily  collagenase Ointment 1 Application(s) Topical daily  diVALproex Sprinkle 500 milliGRAM(s) Oral two times a day  heparin   Injectable 5000 Unit(s) SubCutaneous every 12 hours  iron sucrose IVPB 200 milliGRAM(s) IV Intermittent every 24 hours  lactobacillus acidophilus 1 Tablet(s) Oral two times a day  levETIRAcetam  Solution 750 milliGRAM(s) Oral two times a day  levothyroxine 225 MICROGram(s) Oral daily  loratadine 10 milliGRAM(s) Oral daily  magnesium oxide 400 milliGRAM(s) Oral three times a day with meals  metoprolol tartrate 50 milliGRAM(s) Oral every 8 hours  multivitamin 1 Tablet(s) Oral daily  mupirocin 2% Ointment 1 Application(s) Topical two times a day  pantoprazole   Suspension 40 milliGRAM(s) Oral daily  PHENobarbital 64.8 milliGRAM(s) Oral two times a day  sertraline 100 milliGRAM(s) Oral daily  tamsulosin 0.4 milliGRAM(s) Oral at bedtime    SOCIAL       FAMILY   FAMILY HISTORY:    REVIEW OF SYSTEMS:  POOR HISTORIAN NO COMPLAINTS           Physical Exam:  ICU Vital Signs Last 24 Hrs  T(C): 36.8 (13 Oct 2020 07:25), Max: 37.2 (13 Oct 2020 00:08)  T(F): 98.2 (13 Oct 2020 07:25), Max: 98.9 (13 Oct 2020 00:08)  HR: 67 (13 Oct 2020 07:25) (67 - 104)  BP: 112/63 (13 Oct 2020 07:25) (98/64 - 129/69)  BP(mean): --  ABP: --  ABP(mean): --  RR: 18 (13 Oct 2020 07:25) (18 - 18)  SpO2: 96% (13 Oct 2020 07:25) (94% - 96%)    GEN: NAD,   HEENT: normocephalic and atraumatic. EOMI. RICH.    NECK: Supple. No carotid bruits.  No lymphadenopathy or thyromegaly.  LUNGS: Clear to auscultation.  HEART: Regular rate and rhythm without murmur.  ABDOMEN: Soft, nontender, and nondistended.  Positive bowel sounds. PEG IN PLACE    : No CVA tenderness  EXTREMITIES: Without any cyanosis, clubbing, rash, lesions or edema.  MSK: no joint swelling  NEUROLOGIC:  CONTRACTED         Labs:  Vitals:  ============  T(F): 98.2 (13 Oct 2020 07:25), Max: 98.9 (13 Oct 2020 00:08)  HR: 67 (13 Oct 2020 07:25)  BP: 112/63 (13 Oct 2020 07:25)  RR: 18 (13 Oct 2020 07:25)  SpO2: 96% (13 Oct 2020 07:25) (94% - 96%)  temp max in last 48H T(F): , Max: 102 (10-11-20 @ 20:42)    =======================================================  Current Antibiotics:  cefTRIAXone   IVPB 1000 milliGRAM(s) IV Intermittent every 24 hours    Other medications:  ascorbic acid 500 milliGRAM(s) Oral daily  aspirin  chewable 81 milliGRAM(s) Enteral Tube daily  chlorhexidine 2% Cloths 1 Application(s) Topical <User Schedule>  clopidogrel Tablet 75 milliGRAM(s) Oral daily  collagenase Ointment 1 Application(s) Topical daily  diVALproex Sprinkle 500 milliGRAM(s) Oral two times a day  heparin   Injectable 5000 Unit(s) SubCutaneous every 12 hours  iron sucrose IVPB 200 milliGRAM(s) IV Intermittent every 24 hours  lactobacillus acidophilus 1 Tablet(s) Oral two times a day  levETIRAcetam  Solution 750 milliGRAM(s) Oral two times a day  levothyroxine 225 MICROGram(s) Oral daily  loratadine 10 milliGRAM(s) Oral daily  magnesium oxide 400 milliGRAM(s) Oral three times a day with meals  metoprolol tartrate 50 milliGRAM(s) Oral every 8 hours  multivitamin 1 Tablet(s) Oral daily  mupirocin 2% Ointment 1 Application(s) Topical two times a day  pantoprazole   Suspension 40 milliGRAM(s) Oral daily  PHENobarbital 64.8 milliGRAM(s) Oral two times a day  sertraline 100 milliGRAM(s) Oral daily  tamsulosin 0.4 milliGRAM(s) Oral at bedtime      =======================================================  Labs:                        9.6    5.05  )-----------( 235      ( 12 Oct 2020 08:45 )             30.2      10-13    132<L>  |  94<L>  |  6.0<L>  ----------------------------<  104<H>  3.3<L>   |  25.0  |  0.28<L>    Ca    8.4<L>      13 Oct 2020 08:01  Phos  2.8     10-12  Mg     1.7     10-13        Culture - Blood (collected 10-09-20 @ 22:17)  Source: .Blood Blood    Culture - Blood (collected 10-09-20 @ 22:17)  Source: .Blood Blood    Culture - Urine (collected 10-09-20 @ 21:58)  Source: .Urine Clean Catch (Midstream)  Final Report (10-11-20 @ 18:53):    >100,000 CFU/ml Providencia stuartii    >100,000 CFU/ml Proteus mirabilis  Organism: Providencia stuartii  Proteus mirabilis (10-11-20 @ 18:53)  Organism: Proteus mirabilis (10-11-20 @ 18:53)    Sensitivities:      -  Amikacin: S <=16      -  Amoxicillin/Clavulanic Acid: S <=8/4      -  Ampicillin: S <=8 These ampicillin results predict results for amoxicillin      -  Ampicillin/Sulbactam: S <=4/2 Enterobacter, Citrobacter, and Serratia may develop resistance during prolonged therapy (3-4 days)      -  Aztreonam: S <=4      -  Cefazolin: S <=2 (MIC_CL_COM_ENTERIC_CEFAZU) For uncomplicated UTI with K. pneumoniae, E. coli, or P. mirablis: DEBBIE <=16 is sensitive and DEBBIE >=32 is resistant. This also predicts results for oral agents cefaclor, cefdinir, cefpodoxime, cefprozil, cefuroxime axetil, cephalexin and locarbef for uncomplicated UTI. Note that some isolates may be susceptible to these agents while testing resistant to cefazolin.      -  Cefepime: S <=2      -  Cefoxitin: S <=8      -  Ceftriaxone: S <=1 Enterobacter, Citrobacter, and Serratia may develop resistance during prolonged therapy      -  Ciprofloxacin: R 2      -  Ertapenem: S <=0.5      -  Gentamicin: S <=2      -  Levofloxacin: I 1      -  Meropenem: S <=1      -  Nitrofurantoin: R >64 Should not be used to treat pyelonephritis      -  Piperacillin/Tazobactam: S <=8      -  Tobramycin: S 4      -  Trimethoprim/Sulfamethoxazole: S <=0.5/9.5      Method Type: DEBBIE  Organism: Providencia stuartii (10-11-20 @ 18:53)    Sensitivities:      -  Amikacin: S <=16      -  Amoxicillin/Clavulanic Acid: R <=8/4      -  Ampicillin: R <=8 These ampicillin results predict results for amoxicillin      -  Ampicillin/Sulbactam: S <=4/2 Enterobacter, Citrobacter, and Serratia may develop resistance during prolonged therapy (3-4 days)      -  Aztreonam: S <=4      -  Cefazolin: R 4      -  Cefepime: S <=2      -  Cefoxitin: S <=8      -  Ceftriaxone: S <=1 Enterobacter, Citrobacter, and Serratia may develop resistance during prolonged therapy      -  Ciprofloxacin: R >2      -  Ertapenem: S <=0.5      -  Levofloxacin: I 1      -  Meropenem: S <=1      -  Nitrofurantoin: R >64 Should not be used to treat pyelonephritis      -  Piperacillin/Tazobactam: S <=8      -  Tigecycline: R 4      -  Trimethoprim/Sulfamethoxazole: S <=0.5/9.5      Method Type: DEBBIE    Culture - Blood (collected 10-09-20 @ 16:20)  Source: .Blood Blood-Peripheral    Culture - Blood (collected 10-09-20 @ 16:19)  Source: .Blood Blood-Peripheral      Creatinine, Serum: 0.28 mg/dL (10-13-20 @ 08:01)  Creatinine, Serum: 0.32 mg/dL (10-12-20 @ 08:45)  Creatinine, Serum: 0.37 mg/dL (10-11-20 @ 07:12)  Creatinine, Serum: 0.44 mg/dL (10-10-20 @ 03:17)  Creatinine, Serum: 0.52 mg/dL (10-09-20 @ 16:07)    Procalcitonin, Serum: 0.44 ng/mL (10-09-20 @ 16:07)    D-Dimer Assay, Quantitative: 996 ng/mL DDU (10-09-20 @ 16:07)    Ferritin, Serum: 357 ng/mL (10-12-20 @ 08:45)  Ferritin, Serum: 238 ng/mL (10-09-20 @ 16:07)    C-Reactive Protein, Serum: 16.03 mg/dL (10-09-20 @ 16:07)    WBC Count: 5.05 K/uL (10-12-20 @ 08:45)  WBC Count: 6.50 K/uL (10-11-20 @ 07:12)  WBC Count: 23.97 K/uL (10-10-20 @ 03:17)  WBC Count: 32.23 K/uL (10-09-20 @ 16:07)    Rapid RVP Result: NotDetec (10-09-20 @ 17:36)    COVID-19 PCR: NotDetec (10-09-20 @ 15:47)      Alkaline Phosphatase, Serum: 83 U/L (10-09-20 @ 16:07)  Alanine Aminotransferase (ALT/SGPT): 19 U/L (10-09-20 @ 16:07)  Aspartate Aminotransferase (AST/SGOT): 26 U/L (10-09-20 @ 16:07)  Bilirubin Total, Serum: 0.4 mg/dL (10-09-20 @ 16:07)

## 2020-10-13 NOTE — ADVANCED PRACTICE NURSE CONSULT - ASSESSMENT
Alert with confusing, bedbound, urethral catheter in place and incontinent of stool.  Skin warm, dry with poor turgor, scattered areas of hyperpigmentation and hypopigmentation, scattered areas of ecchymosis on bilateral upper extremities, blanchable erythema to bilateral heels.    Patient is noted to have an Unstageable Pressure Injury to his sacrum which measures 5.4 x 3.0 cm, stable, intact soft eschar in place, with no drainage.  No erythema, warmth or induration noted to periwound.

## 2020-10-13 NOTE — ADVANCED PRACTICE NURSE CONSULT - REASON FOR CONSULT
Patient seen on skin care rounds after wound care referral received for assessment of skin impairment and recommendations of topical management.  Chart reviewed:   Librado (12), Serum albumin (3.9 g/dL) and Total Protein (8.3 g/dL).  Patient H/O MR, HTN, bipolar disease, seizure disorder was BIBA from [facility] after being found to have a fever x 1 day.

## 2020-10-13 NOTE — CHART NOTE - NSCHARTNOTEFT_GEN_A_CORE
Nutrition Note: Pt receiving Jevity 1.5cal  at 60cchr continuous feedings. Call for bolus feeds recommendation.    Rec Jevity 1.5cal 240cc x 5 bolus daily to provide 1200cc, 1800kcal, 77gr pro

## 2020-10-14 ENCOUNTER — TRANSCRIPTION ENCOUNTER (OUTPATIENT)
Age: 65
End: 2020-10-14

## 2020-10-14 LAB
ANION GAP SERPL CALC-SCNC: 14 MMOL/L — SIGNIFICANT CHANGE UP (ref 5–17)
BUN SERPL-MCNC: 8 MG/DL — SIGNIFICANT CHANGE UP (ref 8–20)
CALCIUM SERPL-MCNC: 9.2 MG/DL — SIGNIFICANT CHANGE UP (ref 8.6–10.2)
CHLORIDE SERPL-SCNC: 99 MMOL/L — SIGNIFICANT CHANGE UP (ref 98–107)
CO2 SERPL-SCNC: 24 MMOL/L — SIGNIFICANT CHANGE UP (ref 22–29)
CREAT SERPL-MCNC: 0.28 MG/DL — LOW (ref 0.5–1.3)
CULTURE RESULTS: SIGNIFICANT CHANGE UP
GLUCOSE SERPL-MCNC: 120 MG/DL — HIGH (ref 70–99)
MAGNESIUM SERPL-MCNC: 1.9 MG/DL — SIGNIFICANT CHANGE UP (ref 1.6–2.6)
POTASSIUM SERPL-MCNC: 4.1 MMOL/L — SIGNIFICANT CHANGE UP (ref 3.5–5.3)
POTASSIUM SERPL-SCNC: 4.1 MMOL/L — SIGNIFICANT CHANGE UP (ref 3.5–5.3)
SODIUM SERPL-SCNC: 137 MMOL/L — SIGNIFICANT CHANGE UP (ref 135–145)
SPECIMEN SOURCE: SIGNIFICANT CHANGE UP

## 2020-10-14 PROCEDURE — 99232 SBSQ HOSP IP/OBS MODERATE 35: CPT

## 2020-10-14 RX ORDER — DILTIAZEM HCL 120 MG
1 CAPSULE, EXT RELEASE 24 HR ORAL
Qty: 0 | Refills: 0 | DISCHARGE

## 2020-10-14 RX ORDER — LEVOTHYROXINE SODIUM 125 MCG
1 TABLET ORAL
Qty: 0 | Refills: 0 | DISCHARGE
Start: 2020-10-14

## 2020-10-14 RX ORDER — OLOPATADINE HYDROCHLORIDE 1 MG/ML
1 SOLUTION/ DROPS OPHTHALMIC
Qty: 0 | Refills: 0 | DISCHARGE

## 2020-10-14 RX ORDER — VALPROIC ACID (AS SODIUM SALT) 250 MG/5ML
5 SOLUTION, ORAL ORAL
Qty: 0 | Refills: 0 | DISCHARGE

## 2020-10-14 RX ORDER — LEVOTHYROXINE SODIUM 125 MCG
1 TABLET ORAL
Qty: 0 | Refills: 0 | DISCHARGE

## 2020-10-14 RX ORDER — FERROUS SULFATE 325(65) MG
300 TABLET ORAL
Refills: 0 | Status: DISCONTINUED | OUTPATIENT
Start: 2020-10-14 | End: 2020-10-15

## 2020-10-14 RX ORDER — CEFPODOXIME PROXETIL 100 MG
1 TABLET ORAL
Qty: 2 | Refills: 0
Start: 2020-10-14 | End: 2020-10-14

## 2020-10-14 RX ORDER — CEFPODOXIME PROXETIL 100 MG
200 TABLET ORAL EVERY 12 HOURS
Refills: 0 | Status: DISCONTINUED | OUTPATIENT
Start: 2020-10-15 | End: 2020-10-15

## 2020-10-14 RX ORDER — COLLAGENASE CLOSTRIDIUM HIST. 250 UNIT/G
1 OINTMENT (GRAM) TOPICAL
Qty: 1 | Refills: 0
Start: 2020-10-14

## 2020-10-14 RX ORDER — COLLAGENASE CLOSTRIDIUM HIST. 250 UNIT/G
1 OINTMENT (GRAM) TOPICAL
Qty: 0 | Refills: 0 | DISCHARGE
Start: 2020-10-14

## 2020-10-14 RX ORDER — ACETAMINOPHEN 500 MG
2 TABLET ORAL
Qty: 0 | Refills: 0 | DISCHARGE

## 2020-10-14 RX ORDER — OMEPRAZOLE 10 MG/1
1 CAPSULE, DELAYED RELEASE ORAL
Qty: 0 | Refills: 0 | DISCHARGE

## 2020-10-14 RX ORDER — MUPIROCIN 20 MG/G
0 OINTMENT TOPICAL
Qty: 0 | Refills: 0 | DISCHARGE

## 2020-10-14 RX ORDER — DIVALPROEX SODIUM 500 MG/1
4 TABLET, DELAYED RELEASE ORAL
Qty: 0 | Refills: 0 | DISCHARGE

## 2020-10-14 RX ORDER — TAMSULOSIN HYDROCHLORIDE 0.4 MG/1
1 CAPSULE ORAL
Qty: 0 | Refills: 0 | DISCHARGE

## 2020-10-14 RX ORDER — LEVETIRACETAM 250 MG/1
7.5 TABLET, FILM COATED ORAL
Qty: 0 | Refills: 0 | DISCHARGE

## 2020-10-14 RX ORDER — MUPIROCIN 20 MG/G
1 OINTMENT TOPICAL
Qty: 0 | Refills: 0 | DISCHARGE
Start: 2020-10-14

## 2020-10-14 RX ORDER — SERTRALINE 25 MG/1
1.5 TABLET, FILM COATED ORAL
Qty: 0 | Refills: 0 | DISCHARGE

## 2020-10-14 RX ORDER — PANTOPRAZOLE SODIUM 20 MG/1
1 TABLET, DELAYED RELEASE ORAL
Qty: 0 | Refills: 0 | DISCHARGE
Start: 2020-10-14

## 2020-10-14 RX ORDER — IPRATROPIUM/ALBUTEROL SULFATE 18-103MCG
3 AEROSOL WITH ADAPTER (GRAM) INHALATION
Qty: 0 | Refills: 0 | DISCHARGE

## 2020-10-14 RX ORDER — POLYETHYLENE GLYCOL 3350 17 G/17G
1 POWDER, FOR SOLUTION ORAL
Qty: 0 | Refills: 0 | DISCHARGE

## 2020-10-14 RX ORDER — POLYETHYLENE GLYCOL 3350 17 G/17G
0 POWDER, FOR SOLUTION ORAL
Qty: 0 | Refills: 0 | DISCHARGE
Start: 2020-10-14

## 2020-10-14 RX ORDER — SERTRALINE 25 MG/1
1 TABLET, FILM COATED ORAL
Qty: 0 | Refills: 0 | DISCHARGE

## 2020-10-14 RX ORDER — METOPROLOL TARTRATE 50 MG
1 TABLET ORAL
Qty: 0 | Refills: 0 | DISCHARGE

## 2020-10-14 RX ORDER — DENOSUMAB 60 MG/ML
0 INJECTION SUBCUTANEOUS
Qty: 0 | Refills: 0 | DISCHARGE

## 2020-10-14 RX ORDER — ASCORBIC ACID 60 MG
1 TABLET,CHEWABLE ORAL
Qty: 0 | Refills: 0 | DISCHARGE

## 2020-10-14 RX ORDER — COLLAGENASE CLOSTRIDIUM HIST. 250 UNIT/G
1 OINTMENT (GRAM) TOPICAL
Qty: 0 | Refills: 0 | DISCHARGE

## 2020-10-14 RX ORDER — ASCORBIC ACID 60 MG
1 TABLET,CHEWABLE ORAL
Qty: 30 | Refills: 0
Start: 2020-10-14 | End: 2020-11-12

## 2020-10-14 RX ORDER — FERROUS SULFATE 325(65) MG
1 TABLET ORAL
Qty: 90 | Refills: 0
Start: 2020-10-14 | End: 2020-11-12

## 2020-10-14 RX ORDER — PANTOPRAZOLE SODIUM 20 MG/1
1 TABLET, DELAYED RELEASE ORAL
Qty: 0 | Refills: 0 | DISCHARGE

## 2020-10-14 RX ORDER — MELOXICAM 15 MG/1
1 TABLET ORAL
Qty: 0 | Refills: 0 | DISCHARGE

## 2020-10-14 RX ORDER — MUPIROCIN 20 MG/G
1 OINTMENT TOPICAL
Refills: 0 | Status: DISCONTINUED | OUTPATIENT
Start: 2020-10-14 | End: 2020-10-15

## 2020-10-14 RX ADMIN — CHLORHEXIDINE GLUCONATE 1 APPLICATION(S): 213 SOLUTION TOPICAL at 05:01

## 2020-10-14 RX ADMIN — MUPIROCIN 1 APPLICATION(S): 20 OINTMENT TOPICAL at 17:37

## 2020-10-14 RX ADMIN — HEPARIN SODIUM 5000 UNIT(S): 5000 INJECTION INTRAVENOUS; SUBCUTANEOUS at 05:02

## 2020-10-14 RX ADMIN — Medication 1 APPLICATION(S): at 12:10

## 2020-10-14 RX ADMIN — CLOPIDOGREL BISULFATE 75 MILLIGRAM(S): 75 TABLET, FILM COATED ORAL at 12:09

## 2020-10-14 RX ADMIN — Medication 50 MILLIGRAM(S): at 13:51

## 2020-10-14 RX ADMIN — PANTOPRAZOLE SODIUM 40 MILLIGRAM(S): 20 TABLET, DELAYED RELEASE ORAL at 12:09

## 2020-10-14 RX ADMIN — Medication 1 TABLET(S): at 05:02

## 2020-10-14 RX ADMIN — DIVALPROEX SODIUM 500 MILLIGRAM(S): 500 TABLET, DELAYED RELEASE ORAL at 05:02

## 2020-10-14 RX ADMIN — Medication 64.8 MILLIGRAM(S): at 17:36

## 2020-10-14 RX ADMIN — Medication 81 MILLIGRAM(S): at 12:09

## 2020-10-14 RX ADMIN — MUPIROCIN 1 APPLICATION(S): 20 OINTMENT TOPICAL at 05:01

## 2020-10-14 RX ADMIN — Medication 1 TABLET(S): at 12:09

## 2020-10-14 RX ADMIN — LORATADINE 10 MILLIGRAM(S): 10 TABLET ORAL at 12:09

## 2020-10-14 RX ADMIN — Medication 225 MICROGRAM(S): at 05:02

## 2020-10-14 RX ADMIN — Medication 300 MILLIGRAM(S): at 22:29

## 2020-10-14 RX ADMIN — Medication 64.8 MILLIGRAM(S): at 05:02

## 2020-10-14 RX ADMIN — Medication 500 MILLIGRAM(S): at 12:09

## 2020-10-14 RX ADMIN — LEVETIRACETAM 750 MILLIGRAM(S): 250 TABLET, FILM COATED ORAL at 05:02

## 2020-10-14 RX ADMIN — MUPIROCIN 1 APPLICATION(S): 20 OINTMENT TOPICAL at 05:02

## 2020-10-14 RX ADMIN — LEVETIRACETAM 750 MILLIGRAM(S): 250 TABLET, FILM COATED ORAL at 17:36

## 2020-10-14 RX ADMIN — SERTRALINE 100 MILLIGRAM(S): 25 TABLET, FILM COATED ORAL at 12:09

## 2020-10-14 RX ADMIN — Medication 1 TABLET(S): at 17:36

## 2020-10-14 RX ADMIN — CEFTRIAXONE 100 MILLIGRAM(S): 500 INJECTION, POWDER, FOR SOLUTION INTRAMUSCULAR; INTRAVENOUS at 07:56

## 2020-10-14 RX ADMIN — HEPARIN SODIUM 5000 UNIT(S): 5000 INJECTION INTRAVENOUS; SUBCUTANEOUS at 17:36

## 2020-10-14 RX ADMIN — DIVALPROEX SODIUM 500 MILLIGRAM(S): 500 TABLET, DELAYED RELEASE ORAL at 17:36

## 2020-10-14 NOTE — DISCHARGE NOTE PROVIDER - INSTRUCTIONS
Thin liquids orally.  Jevity 1.5cal 240cc x 5 bolus daily to provide 1200cc, 1800kcal, 77gr pro. via PEG   Thin liquids orally.  Jevity 1.5cal 240cc x 5 bolus daily to provide 1200cc, 1800kcal, 77gr pro. via PEG  Free Water 120 cc's via PEG 3x daily   Resume prior diet, Mechanical soft (ground) diet with thin liquids with Ensure as prior to admission.    Resume prior diet, Mechanical soft (ground) diet with thin liquids with Ensure as prior to admission.   Low Salt / Low Fat.  Aspiration Precautions.

## 2020-10-14 NOTE — DISCHARGE NOTE PROVIDER - PROVIDER TOKENS
PROVIDER:[TOKEN:[[id number]]] PROVIDER:[TOKEN:[[id number]]],FREE:[LAST:[Wiliam],FIRST:[Richa],PHONE:[(   )    -],FAX:[(   )    -],ADDRESS:[PCP]]

## 2020-10-14 NOTE — PROGRESS NOTE ADULT - ASSESSMENT
65 years old male with PMH of MR, Seizure Disorder, CVA, A. Flutter s/p WATCHMAN, Hypothyroidism and Indwelling Garcia's Catheter presented from [facility] with fever and hypoxia,     1) Sepsis secondary to UTI  - Has chronic indwelling Garcia's catheter, changed in ER  - Urine culture with Providencia and Proteus  - Blood cultures negative   - Sepsis resolved   - Continue Rocephin, will change to Vantin on discharge   - ID follow up noted      2) Acute Respiratory Failure with Hypoxia  - Likely due to Sepsis  - No PE or Pneumonia on CTA Chest   - Improved        3) Decubitus Ulcers  - Present on admission  - Patient is refusing exam. As per documents: Stage 2 left gluteal and right heel pressure ulcers   - Wound care eval noted   - Frequent repositioning   - Heel Off    - Air fluidized bed     4) History of CVA  - Continue Aspirin and Plavix   - Lipitor was discontinued in August 2020 as per records     5) Chronic A. Flutter  - s/p WATCHMAN  - Continue Plavix   - Resume Metoprolol  - Will resume Cardizem if BP remains stable     6) Hypothyroidism  - Continue Synthroid    7) Seizure Disorder   - Continue Keppra, Phenobarb and Divalproex     8) Dysphagia  - Speech therapy recommending thin liquids only. Will continue Tube feeding via PEG as patient is refusing to eat.     9) Anemia  - FOBT negative  - Low iron stores, started Venofer 200 mg x 5    10) Hypokalemia / Hypophosphatemia  - Repleted     11) Hyponatremia  - Resolved    DVT Prophylaxis -- Heparin SQ    Dispo: [facility] tomorrow pending approval for Jevity.

## 2020-10-14 NOTE — DISCHARGE NOTE PROVIDER - HOSPITAL COURSE
65 years old male with PMH of MR, Seizure Disorder, CVA, A. Flutter s/p WATCHMAN, Hypothyroidism and Indwelling Garcia's Catheter presented from [facility] with fever and hypoxia, blood cultures negative. The patient's  indwelling Garcia's catheter was changed in ER. Urine culture with Providencia and Proteus. The patient was seen in consultation by ID. Received IV antibiotics: Rocephin, changed to oral to complete course. No PE or Pneumonia on CTA Chest. Patient refusing oral nutrition, seen by Dietician, receiving PEG bolus feeds. Patient will need to be re evaluated by Speech therapy for resumption of prior oral diet. Currently patient was recommended to continue  thin liquids orally with bolus PEG feeds. Wound care was consulted for  Stage 2 left gluteal and right heel pressure ulcers. Recommend local wound care, Frequent repositioning , and heel Off . Electrolytes monitored and replaced as needed. The patient is medically stable for discharge.            65 years old male with PMH of MR, Seizure Disorder, CVA, A. Flutter s/p WATCHMAN, Hypothyroidism and Indwelling Garcia's Catheter presented from [facility] with fever and hypoxia, blood cultures negative. The patient's  indwelling Garcia's catheter was changed in ER. Urine culture with Providencia and Proteus. The patient was seen in consultation by ID. Received IV antibiotics: Rocephin, changed to oral to complete course. No PE or Pneumonia on CTA Chest. Patient refusing oral nutrition, seen by Dietician, receiving PEG bolus feeds. Patient will need to be re evaluated by Speech therapy for resumption of prior oral diet. Currently patient was recommended to continue  thin liquids orally with bolus PEG feeds. Wound care was consulted for  Stage 2 left gluteal and right heel pressure ulcers. Recommend local wound care, Frequent repositioning , and heel Off . Electrolytes monitored and replaced as needed. The patient is medically stable for discharge.   Vital Signs Last 24 Hrs  T(C): 37.1 (14 Oct 2020 07:55), Max: 37.1 (14 Oct 2020 07:55)  T(F): 98.7 (14 Oct 2020 07:55), Max: 98.7 (14 Oct 2020 07:55)  HR: 88 (14 Oct 2020 07:55) (76 - 89)  BP: 113/67 (14 Oct 2020 07:55) (100/59 - 127/76)  BP(mean): --  RR: 18 (14 Oct 2020 07:55) (18 - 20)  SpO2: 98% (13 Oct 2020 23:34) (95% - 98%)        14 Oct 2020 06:58    137    |  99     |  8.0    ----------------------------<  120    4.1     |  24.0   |  0.28     Ca    9.2        14 Oct 2020 06:58  Mg     1.9       14 Oct 2020 06:58        CAPILLARY BLOOD GLUCOSE      PE:  General:  No acute distress  HEENT: EOMI. Clear conjunctivae. Moist mucus membrane  Neck: Supple.   Chest: CTA bilaterally. No wheezing, rales or rhonchi. N  Heart: Normal S1 & S2. RRR.   Abdomen: Soft. Non-tender. Non-distended. + BS. PEG in place.   : Garcia's catheter in place  Ext: 1+ pedal edema. No calf tenderness   Neuro: Awake, non focal  Psychiatry: Flat affect                   65 years old male with PMH of MR, Seizure Disorder, CVA, A. Flutter s/p WATCHMAN, Hypothyroidism and Indwelling Garcia's Catheter presented from [facility] with fever and hypoxia, blood cultures negative. The patient's  indwelling Garcia's catheter was changed in ER. Urine culture with Providencia and Proteus. The patient was seen in consultation by ID. Received IV antibiotics: Rocephin, changed to oral to complete course. No PE or Pneumonia on CTA Chest. Patient refusing oral nutrition, seen by Dietician, receiving PEG bolus feeds. Patient will need to be re evaluated by Speech therapy for resumption of prior oral diet. Currently patient was recommended to continue  thin liquids orally with bolus PEG feeds. Wound care was consulted for  Stage 2 left gluteal and right heel pressure ulcers. Recommend local wound care, Frequent repositioning , and heel Off . Electrolytes monitored and replaced as needed. The patient is medically stable for discharge.     PHYSICAL EXAM  Vital Signs   T(C): 37.1 (14 Oct 2020 07:55), Max: 37.1 (14 Oct 2020 07:55)  T(F): 98.7 (14 Oct 2020 07:55), Max: 98.7 (14 Oct 2020 07:55)  HR: 88 (14 Oct 2020 07:55) (76 - 89)  BP: 113/67 (14 Oct 2020 07:55) (100/59 - 127/76)  RR: 18 (14 Oct 2020 07:55) (18 - 20)  SpO2: 98% (13 Oct 2020 23:34) (95% - 98%)  General:  No acute distress  HEENT: EOMI. Clear conjunctivae. Moist mucus membrane  Neck: Supple.   Chest: CTA bilaterally. No wheezing, rales or rhonchi. N  Heart: Normal S1 & S2. RRR.   Abdomen: Soft. Non-tender. Non-distended. + BS. PEG in place.   : Garcia's catheter in place  Ext: 1+ pedal edema. No calf tenderness   Neuro: Awake, non focal  Psychiatry: Flat affect    14 Oct 2020 06:58    137    |  99     |  8.0    ----------------------------<  120    4.1     |  24.0   |  0.28     Ca    9.2        14 Oct 2020 06:58  Mg     1.9       14 Oct 2020 06:58    Time spent: 40 minutes                          65 years old male with PMH of MR, Seizure Disorder, CVA, A. Flutter s/p WATCHMAN, Hypothyroidism and Indwelling Garcia's Catheter presented from [facility] with fever and hypoxia, blood cultures negative. The patient's  indwelling Garcia's catheter was changed in ER. Urine culture with Providencia and Proteus. The patient was seen in consultation by ID. Received IV antibiotics: Rocephin, changed to oral to complete course. No PE or Pneumonia on CTA Chest. Patient refusing oral nutrition, seen by Dietician, receiving PEG bolus feeds. Patient will need to be re evaluated by Speech therapy for resumption of prior oral diet. Currently patient was recommended to continue  thin liquids orally with bolus PEG feeds. Wound care was consulted for  Stage 2 left gluteal and right heel pressure ulcers. Recommend local wound care, Frequent repositioning , and heel Off . Electrolytes monitored and replaced as needed. The patient is medically stable for discharge.   Vital Signs Last 24 Hrs  T(C): 36.8 (15 Oct 2020 07:47), Max: 37 (14 Oct 2020 15:51)  T(F): 98.2 (15 Oct 2020 07:47), Max: 98.6 (14 Oct 2020 15:51)  HR: 80 (15 Oct 2020 07:47) (64 - 93)  BP: 128/78 (15 Oct 2020 07:47) (102/61 - 128/78)  BP(mean): --  RR: 18 (15 Oct 2020 07:47) (18 - 19)  SpO2: 96% (14 Oct 2020 15:51) (96% - 96%)    General:  No acute distress  HEENT: EOMI. Clear conjunctivae. Moist mucus membrane  Neck: Supple.   Chest: CTA bilaterally. No wheezing, rales or rhonchi. N  Heart: Normal S1 & S2. RRR.   Abdomen: Soft. Non-tender. Non-distended. + BS. PEG in place.   : Garcia's catheter in place  Ext: 1+ pedal edema. No calf tenderness   Neuro: Awake, non focal  Psychiatry: Flat affect      Time spent: 40 minutes                          65 years old male with PMH of MR, Seizure Disorder, CVA, A. Flutter s/p WATCHMAN, Hypothyroidism and Indwelling Garcia's Catheter presented from [facility] with fever and hypoxia, blood cultures negative. The patient's  indwelling Garcia's catheter was changed in ER. Urine culture with Providencia and Proteus. The patient was seen in consultation by ID. Received IV antibiotics: Rocephin, changed to oral to complete course. No PE or Pneumonia on CTA Chest. Patient refusing oral nutrition, seen by Dietician, receiving PEG bolus feeds. Patient will need to be re evaluated by Speech therapy for resumption of prior oral diet. Currently patient was recommended to continue  thin liquids orally with bolus PEG feeds. Wound care was consulted for  Stage 2 left gluteal and right heel pressure ulcers. Recommend local wound care, Frequent repositioning , and heel Off . Electrolytes monitored and replaced as needed. The patient is medically stable for discharge.     PHYSICAL EXAM:  Vital Signs   T(C): 36.8 (15 Oct 2020 07:47), Max: 37 (14 Oct 2020 15:51)  T(F): 98.2 (15 Oct 2020 07:47), Max: 98.6 (14 Oct 2020 15:51)  HR: 80 (15 Oct 2020 07:47) (64 - 93)  BP: 128/78 (15 Oct 2020 07:47) (102/61 - 128/78)  RR: 18 (15 Oct 2020 07:47) (18 - 19)  SpO2: 96% (14 Oct 2020 15:51) (96% - 96%)  General:  No acute distress  HEENT: EOMI. Clear conjunctivae. Moist mucus membrane  Neck: Supple.   Chest: CTA bilaterally. No wheezing, rales or rhonchi. N  Heart: Normal S1 & S2. RRR.   Abdomen: Soft. Non-tender. Non-distended. + BS. PEG in place.   : Garcia's catheter in place  Ext: 1+ pedal edema. No calf tenderness   Neuro: Awake, non focal  Psychiatry: Flat affect    Time spent: 40 minutes

## 2020-10-14 NOTE — DISCHARGE NOTE PROVIDER - NSDCMRMEDTOKEN_GEN_ALL_CORE_FT
aspirin 81 mg oral tablet: 1 tab(s) orally once a day  clopidogrel 75 mg oral tablet: 1 tab(s) orally once a day  Depakene 250 mg/5 mL oral syrup: 5 milliliter(s) orally 3 times a day  Dilantin-125 oral suspension: 5 milliliter(s) orally 2 times a day  levothyroxine 112 mcg (0.112 mg) oral tablet: 1 tab(s) orally once a day  loratadine 10 mg oral tablet: 1 tab(s) orally once a day  meloxicam 15 mg oral tablet: 1 tab(s) orally once a day  Metoprolol Succinate ER 25 mg oral tablet, extended release: 1 tab(s) orally once a day  Multiple Vitamins oral tablet: 1 tab(s) orally once a day  olopatadine 0.1% ophthalmic solution: 1 drop(s) to each affected eye 2 times a day, As Needed  omeprazole 40 mg oral delayed release capsule: 1 cap(s) orally once a day  PHENobarbital 32.4 mg oral tablet: 2 tab(s) orally 2 times a day  Prolia 60 mg/mL subcutaneous solution: subcutaneous every 6 months  refresh  0.5%  drops: 1 drop(s) in each eye 4 times a day, As Needed  sertraline 100 mg oral tablet: 1.5 tab(s) orally once a day  Vitamin C 500 mg oral tablet: 1 tab(s) orally once a day   acetaminophen 325 mg oral tablet: 2 tab(s) orally every 6 hours, As Needed  ascorbic acid 500 mg oral tablet: 1 tab(s) orally once a day  aspirin 81 mg oral tablet: 1 tab(s) orally once a day  Cardizem 120 mg oral tablet: 1 tab(s) orally 3 times a day  cefpodoxime 200 mg oral tablet: 1 tab(s) orally every 12 hours   clopidogrel 75 mg oral tablet: 1 tab(s) orally once a day  Depakote Sprinkles 125 mg oral delayed release capsule: 4 cap(s) orally 2 times a day  ipratropium-albuterol 0.5 mg-2.5 mg/3 mLinhalation solution: 3 milliliter(s) inhaled 4 times a day  Keppra 100 mg/mL oral solution: 7.5 milliliter(s) orally 2 times a day  levothyroxine 25 mcg (0.025 mg) oral tablet: 1 tab(s) orally once a day  loratadine 10 mg oral tablet: 1 tab(s) orally once a day  metoprolol tartrate 50 mg oral tablet: 1 tab(s) orally 3 times a day  Multiple Vitamins oral tablet: 1 tab(s) orally once a day  mupirocin 2% topical ointment: Apply topically to affected area 2 times a day to right heel  PHENobarbital 32.4 mg oral tablet: 2 tab(s) orally 2 times a day  polyethylene glycol 3350 oral powder for reconstitution:  orally every 72 hors if no BM  Protonix 40 mg oral delayed release tablet: 1 tab(s) orally once a day  Santyl 250 units/g topical ointment: 1 application topically once a day to left buttock  sertraline 100 mg oral tablet: 1 tab(s) orally once a day  Synthroid 200 mcg (0.2 mg) oral tablet: 1 tab(s) orally once a day  tamsulosin 0.4 mg oral capsule: 1 cap(s) orally once a day   1.: Jevity 1.5cal 240cc x 5 bolus daily to provide 1200cc, 1800kcal, 77gr pro.  acetaminophen 325 mg oral tablet: 2 tab(s) orally every 6 hours, As Needed  ascorbic acid 500 mg oral tablet: 1 tab(s) orally once a day  aspirin 81 mg oral tablet: 1 tab(s) orally once a day  Cardizem 120 mg oral tablet: 1 tab(s) orally 3 times a day  cefpodoxime 200 mg oral tablet: 1 tab(s) orally every 12 hours   clopidogrel 75 mg oral tablet: 1 tab(s) orally once a day  collagenase 250 units/g topical ointment: Daily cleanse wound and periwound with Normal Saline, apply collagenase (Santyl), nickel-coin thickness, to entire base of wound, cover with saline moistened gauze, dry dressing and Tape.  Depakote Sprinkles 125 mg oral delayed release capsule: 4 cap(s) orally 2 times a day  ipratropium-albuterol 0.5 mg-2.5 mg/3 mLinhalation solution: 3 milliliter(s) inhaled 4 times a day  Keppra 100 mg/mL oral solution: 7.5 milliliter(s) orally 2 times a day  levothyroxine 25 mcg (0.025 mg) oral tablet: 1 tab(s) orally once a day  loratadine 10 mg oral tablet: 1 tab(s) orally once a day  metoprolol tartrate 50 mg oral tablet: 1 tab(s) orally 3 times a day  Multiple Vitamins oral tablet: 1 tab(s) orally once a day  mupirocin 2% topical ointment: Apply topically to affected area 2 times a day to right heel  PHENobarbital 32.4 mg oral tablet: 2 tab(s) orally 2 times a day  polyethylene glycol 3350 oral powder for reconstitution:  orally every 72 hors if no BM  Protonix 40 mg oral delayed release tablet: 1 tab(s) orally once a day  Santyl 250 units/g topical ointment: 1 application topically once a day to left buttock  sertraline 100 mg oral tablet: 1 tab(s) orally once a day  Synthroid 200 mcg (0.2 mg) oral tablet: 1 tab(s) orally once a day  tamsulosin 0.4 mg oral capsule: 1 cap(s) orally once a day   acetaminophen 325 mg oral tablet: 2 tab(s) orally every 6 hours, As Needed  ascorbic acid 500 mg oral tablet: 1 tab(s) orally once a day  aspirin 81 mg oral tablet: 1 tab(s) orally once a day  Cardizem 120 mg oral tablet: 1 tab(s) orally 3 times a day. Resume if BP remains stable.   cefpodoxime 200 mg oral tablet: 1 tab(s) orally every 12 hours   clopidogrel 75 mg oral tablet: 1 tab(s) orally once a day  Depakote Sprinkles 125 mg oral delayed release capsule: 4 cap(s) orally 2 times a day  ferrous sulfate 325 mg (65 mg elemental iron) oral tablet: 1 tab(s) by gastrostomy tube 3 times a day   ipratropium-albuterol 0.5 mg-2.5 mg/3 mLinhalation solution: 3 milliliter(s) inhaled 4 times a day  Keppra 100 mg/mL oral solution: 7.5 milliliter(s) orally 2 times a day  levothyroxine 25 mcg (0.025 mg) oral tablet: 1 tab(s) orally once a day  loratadine 10 mg oral tablet: 1 tab(s) orally once a day  metoprolol tartrate 50 mg oral tablet: 1 tab(s) orally 3 times a day  Multiple Vitamins oral tablet: 1 tab(s) orally once a day  mupirocin 2% topical ointment: Apply topically to affected area 2 times a day to right heel  PHENobarbital 32.4 mg oral tablet: 2 tab(s) orally 2 times a day  polyethylene glycol 3350 oral powder for reconstitution:  orally every 72 hors if no BM  Protonix 40 mg oral delayed release tablet: 1 tab(s) orally once a day  Santyl 250 units/g topical ointment: 1 application topically once a day to left buttock  sertraline 100 mg oral tablet: 1 tab(s) orally once a day  Synthroid 200 mcg (0.2 mg) oral tablet: 1 tab(s) orally once a day  tamsulosin 0.4 mg oral capsule: 1 cap(s) orally once a day

## 2020-10-14 NOTE — PROGRESS NOTE ADULT - SUBJECTIVE AND OBJECTIVE BOX
Fever due to unspecified condition    HPI:  64 y/o M w/ a PMH of MR, HTN, bipolar disease, seizure disorder was BIBA from [facility] after being found to have a fever x 1 day.  Pt is a poor historian, lethargic and information was obtained from chart.  Facility reported pt had fever of 102.4 and hypoxia to 82%, placed on 2L NC and improved to >90%.   At baseline pt is wheelchair bound, verbal and oriented to person and place.  Of note, pt has a chronic buckley and PEG.   (09 Oct 2020 17:39)    Interval History:  Patient was seen and examined at bedside around 9 am. Feeling better however still refusing to eat.   Has been afebrile.    Denies chest pain, shortness of breath, abdominal pain, nausea or vomiting.     ROS:  As per interval history otherwise unremarkable.    PHYSICAL EXAM:  Vital Signs   T(C): 37 (14 Oct 2020 15:51), Max: 37.1 (14 Oct 2020 07:55)  T(F): 98.6 (14 Oct 2020 15:51), Max: 98.7 (14 Oct 2020 07:55)  HR: 91 (14 Oct 2020 15:51) (84 - 93)  BP: 118/73 (14 Oct 2020 15:51) (105/70 - 127/76)  RR: 19 (14 Oct 2020 15:51) (18 - 20)  SpO2: 96% (14 Oct 2020 15:51) (96% - 98%)  General: Elderly male lying in bed comfortably. No acute distress  HEENT: EOMI. Clear conjunctivae. Moist mucus membrane  Neck: Supple.   Chest: CTA bilaterally. No wheezing, rales or rhonchi. N  Heart: Normal S1 & S2. RRR.   Abdomen: Soft. Non-tender. Non-distended. + BS. PEG in place.   : Buckley's catheter in place  Ext: 1+ pedal edema. No calf tenderness   Neuro: Awake   Skin: Warm and Dry  Psychiatry: Normal mood and affect     MEDICATIONS  (STANDING):  ascorbic acid 500 milliGRAM(s) Oral daily  aspirin  chewable 81 milliGRAM(s) Enteral Tube daily  chlorhexidine 2% Cloths 1 Application(s) Topical <User Schedule>  clopidogrel Tablet 75 milliGRAM(s) Oral daily  collagenase Ointment 1 Application(s) Topical daily  diVALproex Sprinkle 500 milliGRAM(s) Oral two times a day  ferrous    sulfate Liquid 300 milliGRAM(s) Oral three times a day with meals  heparin   Injectable 5000 Unit(s) SubCutaneous every 12 hours  lactobacillus acidophilus 1 Tablet(s) Oral two times a day  levETIRAcetam  Solution 750 milliGRAM(s) Oral two times a day  levothyroxine 225 MICROGram(s) Oral daily  loratadine 10 milliGRAM(s) Oral daily  metoprolol tartrate 50 milliGRAM(s) Oral every 8 hours  multivitamin 1 Tablet(s) Oral daily  mupirocin 2% Nasal 1 Application(s) Both Nostrils two times a day  mupirocin 2% Ointment 1 Application(s) Topical two times a day  pantoprazole   Suspension 40 milliGRAM(s) Oral daily  PHENobarbital 64.8 milliGRAM(s) Oral two times a day  sertraline 100 milliGRAM(s) Oral daily  tamsulosin 0.4 milliGRAM(s) Oral at bedtime    MEDICATIONS  (PRN):  acetaminophen    Suspension .. 650 milliGRAM(s) Oral every 6 hours PRN Temp greater or equal to 38C (100.4F), Mild Pain (1 - 3), Moderate Pain (4 - 6)    LABS:    10-14    137  |  99  |  8.0  ----------------------------<  120<H>  4.1   |  24.0  |  0.28<L>    Ca    9.2      14 Oct 2020 06:58  Mg     1.9     10-14    Blood Culture: 10-09 @ 22:17  Organism --  Gram Stain Blood -- Gram Stain --  Specimen Source .Blood Blood  Culture-Blood --    10-09 @ 21:58  Organism Providencia stuartii  Gram Stain Blood -- Gram Stain --  Specimen Source .Urine Clean Catch (Midstream)  Culture-Blood --    RADIOLOGY & ADDITIONAL STUDIES:  Reviewed

## 2020-10-14 NOTE — DISCHARGE NOTE PROVIDER - NSDCFUADDINST_GEN_ALL_CORE_FT
>Recommendation for Unstageable Pressure Injury to Sacrum:  Daily cleanse wound and periwound with Normal Saline, apply collagenase (Santyl), nickel-coin thickness, to entire base of wound, cover with saline moistened gauze, dry dressing and Tape.  >Continue to turn & reposition q2h with Z-sharifa fluidized positioning device, Complete CAIR Heel boots to bilateral feet and single breathable pad, please continue measures to decrease friction/shear/pressure.   >Patient may resume prior level of activity and return to program. >Recommendation for Unstageable Pressure Injury to Sacrum:  Daily cleanse wound and periwound with Normal Saline, apply collagenase (Santyl), nickel-coin thickness, to entire base of wound, cover with saline moistened gauze, dry dressing and Tape.  >Continue to turn & reposition q2h with Z-sharifa fluidized positioning device, Complete CAIR Heel boots to bilateral feet and single breathable pad, please continue measures to decrease friction/shear/pressure.   >Patient may resume prior level of activity and return to program.     >Hold Cardizem for now, resume if BP remains stable.

## 2020-10-14 NOTE — DISCHARGE NOTE NURSING/CASE MANAGEMENT/SOCIAL WORK - PATIENT PORTAL LINK FT
You can access the FollowMyHealth Patient Portal offered by [facility] by registering at the following website: [email or web address]. By joining [employer]’s FollowMyHealth portal, you will also be able to view your health information using other applications (apps) compatible with our system.

## 2020-10-14 NOTE — DISCHARGE NOTE PROVIDER - CARE PROVIDER_API CALL
SILVANO NELSON  Infectious Diseases  [address] [place] [address]  Phone: [phone]  Fax: [phone]  Follow Up Time:    SILVANO NELSON  Infectious Diseases  [address] [place] [address]  Phone: [phone]  Fax: [phone]  Follow Up Time:     Richa Swain  PCP  Phone: (   )    -  Fax: (   )    -  Follow Up Time:

## 2020-10-14 NOTE — DISCHARGE NOTE PROVIDER - NSDCCPCAREPLAN_GEN_ALL_CORE_FT
PRINCIPAL DISCHARGE DIAGNOSIS  Diagnosis: UTI (urinary tract infection)  Assessment and Plan of Treatment: Complete antibiotics as prescribed  Chronic indwelling buckley  Follow up with facility MD 3-5 days, sooner if needed      SECONDARY DISCHARGE DIAGNOSES  Diagnosis: Dysphagia  Assessment and Plan of Treatment: May continue thin liquids orally  Continue Jevity bolus feeds via PEG as prescribed  Needs follow up with SLP for further assessment for swallowing and diet advancement     PRINCIPAL DISCHARGE DIAGNOSIS  Diagnosis: UTI (urinary tract infection)  Assessment and Plan of Treatment: Complete antibiotics as prescribed  Chronic indwelling buckley  Follow up with facility MD 3-5 days, sooner if needed      SECONDARY DISCHARGE DIAGNOSES  Diagnosis: Acute respiratory failure with hypoxia  Assessment and Plan of Treatment: Due to above, resolved

## 2020-10-15 VITALS
HEART RATE: 61 BPM | TEMPERATURE: 98 F | DIASTOLIC BLOOD PRESSURE: 60 MMHG | SYSTOLIC BLOOD PRESSURE: 115 MMHG | RESPIRATION RATE: 16 BRPM | OXYGEN SATURATION: 98 %

## 2020-10-15 LAB — S PNEUM AG UR QL: NEGATIVE — SIGNIFICANT CHANGE UP

## 2020-10-15 PROCEDURE — 99232 SBSQ HOSP IP/OBS MODERATE 35: CPT

## 2020-10-15 PROCEDURE — 0225U NFCT DS DNA&RNA 21 SARSCOV2: CPT

## 2020-10-15 PROCEDURE — 96365 THER/PROPH/DIAG IV INF INIT: CPT

## 2020-10-15 PROCEDURE — 71045 X-RAY EXAM CHEST 1 VIEW: CPT

## 2020-10-15 PROCEDURE — 84295 ASSAY OF SERUM SODIUM: CPT

## 2020-10-15 PROCEDURE — 82803 BLOOD GASES ANY COMBINATION: CPT

## 2020-10-15 PROCEDURE — 83540 ASSAY OF IRON: CPT

## 2020-10-15 PROCEDURE — 83605 ASSAY OF LACTIC ACID: CPT

## 2020-10-15 PROCEDURE — 85025 COMPLETE CBC W/AUTO DIFF WBC: CPT

## 2020-10-15 PROCEDURE — 82728 ASSAY OF FERRITIN: CPT

## 2020-10-15 PROCEDURE — 82435 ASSAY OF BLOOD CHLORIDE: CPT

## 2020-10-15 PROCEDURE — 82330 ASSAY OF CALCIUM: CPT

## 2020-10-15 PROCEDURE — 80048 BASIC METABOLIC PNL TOTAL CA: CPT

## 2020-10-15 PROCEDURE — 82947 ASSAY GLUCOSE BLOOD QUANT: CPT

## 2020-10-15 PROCEDURE — 81001 URINALYSIS AUTO W/SCOPE: CPT

## 2020-10-15 PROCEDURE — 87040 BLOOD CULTURE FOR BACTERIA: CPT

## 2020-10-15 PROCEDURE — 96366 THER/PROPH/DIAG IV INF ADDON: CPT

## 2020-10-15 PROCEDURE — 80053 COMPREHEN METABOLIC PANEL: CPT

## 2020-10-15 PROCEDURE — 86769 SARS-COV-2 COVID-19 ANTIBODY: CPT

## 2020-10-15 PROCEDURE — 99285 EMERGENCY DEPT VISIT HI MDM: CPT | Mod: 25

## 2020-10-15 PROCEDURE — 36415 COLL VENOUS BLD VENIPUNCTURE: CPT

## 2020-10-15 PROCEDURE — 84145 PROCALCITONIN (PCT): CPT

## 2020-10-15 PROCEDURE — 87449 NOS EACH ORGANISM AG IA: CPT

## 2020-10-15 PROCEDURE — 87086 URINE CULTURE/COLONY COUNT: CPT

## 2020-10-15 PROCEDURE — 86140 C-REACTIVE PROTEIN: CPT

## 2020-10-15 PROCEDURE — 92610 EVALUATE SWALLOWING FUNCTION: CPT

## 2020-10-15 PROCEDURE — 87640 STAPH A DNA AMP PROBE: CPT

## 2020-10-15 PROCEDURE — 87641 MR-STAPH DNA AMP PROBE: CPT

## 2020-10-15 PROCEDURE — U0003: CPT

## 2020-10-15 PROCEDURE — 85014 HEMATOCRIT: CPT

## 2020-10-15 PROCEDURE — 84100 ASSAY OF PHOSPHORUS: CPT

## 2020-10-15 PROCEDURE — 82272 OCCULT BLD FECES 1-3 TESTS: CPT

## 2020-10-15 PROCEDURE — 84132 ASSAY OF SERUM POTASSIUM: CPT

## 2020-10-15 PROCEDURE — 87186 SC STD MICRODIL/AGAR DIL: CPT

## 2020-10-15 PROCEDURE — 85379 FIBRIN DEGRADATION QUANT: CPT

## 2020-10-15 PROCEDURE — 86803 HEPATITIS C AB TEST: CPT

## 2020-10-15 PROCEDURE — 93005 ELECTROCARDIOGRAM TRACING: CPT

## 2020-10-15 PROCEDURE — 83550 IRON BINDING TEST: CPT

## 2020-10-15 PROCEDURE — 84466 ASSAY OF TRANSFERRIN: CPT

## 2020-10-15 PROCEDURE — 85018 HEMOGLOBIN: CPT

## 2020-10-15 PROCEDURE — 71275 CT ANGIOGRAPHY CHEST: CPT

## 2020-10-15 PROCEDURE — 99239 HOSP IP/OBS DSCHRG MGMT >30: CPT

## 2020-10-15 PROCEDURE — 83735 ASSAY OF MAGNESIUM: CPT

## 2020-10-15 RX ADMIN — Medication 50 MILLIGRAM(S): at 05:20

## 2020-10-15 RX ADMIN — LORATADINE 10 MILLIGRAM(S): 10 TABLET ORAL at 11:14

## 2020-10-15 RX ADMIN — Medication 200 MILLIGRAM(S): at 05:20

## 2020-10-15 RX ADMIN — Medication 300 MILLIGRAM(S): at 13:03

## 2020-10-15 RX ADMIN — LEVETIRACETAM 750 MILLIGRAM(S): 250 TABLET, FILM COATED ORAL at 05:21

## 2020-10-15 RX ADMIN — CHLORHEXIDINE GLUCONATE 1 APPLICATION(S): 213 SOLUTION TOPICAL at 05:20

## 2020-10-15 RX ADMIN — Medication 64.8 MILLIGRAM(S): at 05:20

## 2020-10-15 RX ADMIN — Medication 1 TABLET(S): at 05:20

## 2020-10-15 RX ADMIN — CLOPIDOGREL BISULFATE 75 MILLIGRAM(S): 75 TABLET, FILM COATED ORAL at 11:14

## 2020-10-15 RX ADMIN — Medication 500 MILLIGRAM(S): at 11:14

## 2020-10-15 RX ADMIN — Medication 300 MILLIGRAM(S): at 09:41

## 2020-10-15 RX ADMIN — SERTRALINE 100 MILLIGRAM(S): 25 TABLET, FILM COATED ORAL at 11:14

## 2020-10-15 RX ADMIN — Medication 225 MICROGRAM(S): at 05:20

## 2020-10-15 RX ADMIN — Medication 1 APPLICATION(S): at 11:15

## 2020-10-15 RX ADMIN — Medication 50 MILLIGRAM(S): at 13:03

## 2020-10-15 RX ADMIN — Medication 1 TABLET(S): at 11:14

## 2020-10-15 RX ADMIN — DIVALPROEX SODIUM 500 MILLIGRAM(S): 500 TABLET, DELAYED RELEASE ORAL at 05:20

## 2020-10-15 RX ADMIN — MUPIROCIN 1 APPLICATION(S): 20 OINTMENT TOPICAL at 05:21

## 2020-10-15 RX ADMIN — HEPARIN SODIUM 5000 UNIT(S): 5000 INJECTION INTRAVENOUS; SUBCUTANEOUS at 05:22

## 2020-10-15 RX ADMIN — MUPIROCIN 1 APPLICATION(S): 20 OINTMENT TOPICAL at 05:22

## 2020-10-15 RX ADMIN — Medication 81 MILLIGRAM(S): at 11:14

## 2020-10-15 RX ADMIN — PANTOPRAZOLE SODIUM 40 MILLIGRAM(S): 20 TABLET, DELAYED RELEASE ORAL at 11:14

## 2020-10-15 NOTE — SWALLOW BEDSIDE ASSESSMENT ADULT - SWALLOW EVAL: DIAGNOSIS
Limited assessment due to behavioral overlay, however, oral stage of swallow WFL for accepted trial of mech soft & thin fluids. Pharyngeal stage of swallow clinically unremarkable for accepted trials with no overt s/s aspiration (with intake of mech soft & small, non-consecutive cup sips of thin fluids)

## 2020-10-15 NOTE — SWALLOW BEDSIDE ASSESSMENT ADULT - ASR SWALLOW ASPIRATION MONITOR
fever/throat clearing/upper respiratory infection/cough/change of breathing pattern/position upright (90Y)/oral hygiene/pneumonia/gurgly voice

## 2020-10-15 NOTE — PROGRESS NOTE ADULT - ATTENDING COMMENTS
Patient was seen and examined at bedside. Doing well. Has been afebrile.  Seen by Speech Therapy again, tolerating mechanical soft with thin liquids.  Finish antibiotics course.  Continue home medications.  Discharge to [facility] today.

## 2020-10-15 NOTE — SWALLOW BEDSIDE ASSESSMENT ADULT - PHARYNGEAL PHASE
Within functional limits Within functional limits/small non-consecuitive cup sips; cough in 1/1 trial when presented via consecutive cup sips

## 2020-10-15 NOTE — SWALLOW BEDSIDE ASSESSMENT ADULT - SWALLOW EVAL: RECOMMENDED FEEDING/EATING TECHNIQUES
check mouth frequently for oral residue/pocketing/position upright (90 degrees)/maintain upright posture during/after eating for 30 mins/no straws/small sips/bites

## 2020-10-15 NOTE — PROGRESS NOTE ADULT - ASSESSMENT
65 years old male with PMH of MR, Seizure Disorder, CVA, A. Flutter s/p WATCHMAN, Hypothyroidism and Indwelling Garcia's Catheter presented from [facility] with fever and hypoxia,     1) Sepsis secondary to UTI  - Has chronic indwelling Garcia's catheter, changed in ER  - Urine culture with Providencia and Proteus  - Blood cultures negative   - Sepsis resolved   - Continue Rocephinalbertod to [facility] for discharge   - ID follow up noted      2) Acute Respiratory Failure with Hypoxia  - Likely due to Sepsis  - No PE or Pneumonia on CTA Chest   - Resolved        3) Decubitus Ulcers  - Present on admission  - Patient is refusing exam. As per documents: Stage 2 left gluteal and right heel pressure ulcers   - Wound care eval noted   - Frequent repositioning   - Heel Off    - Air fluidized bed     4) History of CVA  - Continue Aspirin and Plavix   - Lipitor was discontinued in August 2020 as per records     5) Chronic A. Flutter  - s/p WATCHMAN  - Continue Plavix   - Resume Metoprolol/Cardizem    6) Hypothyroidism  - Continue Synthroid    7) Seizure Disorder   - Continue Keppra, Phenobarb and Divalproex     8) Dysphagia  - Speech therapy recommending thin liquids with mechanical soft diet/Ensure to be given at facility as prior to admission     9) Anemia  - FOBT negative  - Low iron stores, received Venofer, RX sent for iron supplement    10) Hypokalemia / Hypophosphatemia  - Repleted     11) Hyponatremia  - Resolved    DVT Prophylaxis -- Heparin SQ    [facility] today 65 years old male with PMH of MR, Seizure Disorder, CVA, A. Flutter s/p WATCHMAN, Hypothyroidism and Indwelling Garcia's Catheter presented from [facility] with fever and hypoxia,     1) Sepsis secondary to UTI  - Has chronic indwelling Garcia's catheter, changed in ER  - Urine culture with Providencia and Proteus  - Blood cultures negative   - Sepsis resolved   - Changed Rocephin to Vantin for discharge   - ID follow up noted      2) Acute Respiratory Failure with Hypoxia  - Likely due to Sepsis  - No PE or Pneumonia on CTA Chest   - Resolved        3) Decubitus Ulcers  - Present on admission  - Patient is refusing exam. As per documents: Stage 2 left gluteal and right heel pressure ulcers   - Wound care eval noted   - Frequent repositioning   - Heel Off    - Air fluidized bed     4) History of CVA  - Continue Aspirin and Plavix   - Lipitor was discontinued in August 2020 as per records     5) Chronic A. Flutter  - s/p WATCHMAN  - Continue Plavix   - Resume Metoprolol/Cardizem    6) Hypothyroidism  - Continue Synthroid    7) Seizure Disorder   - Continue Keppra, Phenobarb and Divalproex     8) Dysphagia  - Speech therapy recommending thin liquids with mechanical soft diet/Ensure to be given at facility as prior to admission     9) Anemia  - FOBT negative  - Low iron stores, received Venofer, RX sent for iron supplement    10) Hypokalemia / Hypophosphatemia  - Repleted     11) Hyponatremia  - Resolved    DVT Prophylaxis -- Heparin SQ    [facility] today

## 2020-10-15 NOTE — SWALLOW BEDSIDE ASSESSMENT ADULT - SWALLOW EVAL: CURRENT DIET
NPO with PEG feedings, however, pt has been receiving thin fluids, as per NP (RX last swallow eval attempt)

## 2020-10-15 NOTE — SWALLOW BEDSIDE ASSESSMENT ADULT - SPECIFY REASON(S)
to assess safety for intake of mech soft solids, with chocolate cake (pt reportedly likes chocolate cake & will accept it)

## 2020-10-15 NOTE — PROGRESS NOTE ADULT - SUBJECTIVE AND OBJECTIVE BOX
INFECTIOUS DISEASES AND INTERNAL MEDICINE at [place]  =======================================================  Stanton Montanez MD  Diplomates American Board of Internal Medicine and Infectious Diseases  Telephone [phone]  Fax            [phone]  =======================================================    [place] [id number]    Follow up: FEVER UTI    Allergies:  codeine (Unknown)      Medications:  acetaminophen    Suspension .. 650 milliGRAM(s) Oral every 6 hours PRN  ascorbic acid 500 milliGRAM(s) Oral daily  aspirin  chewable 81 milliGRAM(s) Enteral Tube daily  cefTRIAXone   IVPB 1000 milliGRAM(s) IV Intermittent every 24 hours  chlorhexidine 2% Cloths 1 Application(s) Topical <User Schedule>  clopidogrel Tablet 75 milliGRAM(s) Oral daily  collagenase Ointment 1 Application(s) Topical daily  diVALproex Sprinkle 500 milliGRAM(s) Oral two times a day  heparin   Injectable 5000 Unit(s) SubCutaneous every 12 hours  iron sucrose IVPB 200 milliGRAM(s) IV Intermittent every 24 hours  lactobacillus acidophilus 1 Tablet(s) Oral two times a day  levETIRAcetam  Solution 750 milliGRAM(s) Oral two times a day  levothyroxine 225 MICROGram(s) Oral daily  loratadine 10 milliGRAM(s) Oral daily  magnesium oxide 400 milliGRAM(s) Oral three times a day with meals  metoprolol tartrate 50 milliGRAM(s) Oral every 8 hours  multivitamin 1 Tablet(s) Oral daily  mupirocin 2% Ointment 1 Application(s) Topical two times a day  pantoprazole   Suspension 40 milliGRAM(s) Oral daily  PHENobarbital 64.8 milliGRAM(s) Oral two times a day  sertraline 100 milliGRAM(s) Oral daily  tamsulosin 0.4 milliGRAM(s) Oral at bedtime    SOCIAL       FAMILY   FAMILY HISTORY:    REVIEW OF SYSTEMS:  POOR HISTORIAN NO COMPLAINTS           Physical Exam:  I ICU Vital Signs Last 24 Hrs  T(C): 36.8 (15 Oct 2020 07:47), Max: 37 (14 Oct 2020 15:51)  T(F): 98.2 (15 Oct 2020 07:47), Max: 98.6 (14 Oct 2020 15:51)  HR: 80 (15 Oct 2020 07:47) (64 - 93)  BP: 128/78 (15 Oct 2020 07:47) (102/61 - 128/78)  BP(mean): --  ABP: --  ABP(mean): --  RR: 18 (15 Oct 2020 07:47) (18 - 19)  SpO2: 96% (14 Oct 2020 15:51) (96% - 96%)      GEN: NAD,   HEENT: normocephalic and atraumatic. EOMI. RICH.    NECK: Supple. No carotid bruits.  No lymphadenopathy or thyromegaly.  LUNGS: Clear to auscultation.  HEART: Regular rate and rhythm without murmur.  ABDOMEN: Soft, nontender, and nondistended.  Positive bowel sounds. PEG IN PLACE    : No CVA tenderness  EXTREMITIES: Without any cyanosis, clubbing, rash, lesions or edema.  MSK: no joint swelling  NEUROLOGIC:  CONTRACTED         Labs:       =======================================================  Current Antibiotics:  cefTRIAXone   IVPB 1000 milliGRAM(s) IV Intermittent every 24 hours    Other medications:  ascorbic acid 500 milliGRAM(s) Oral daily  aspirin  chewable 81 milliGRAM(s) Enteral Tube daily  chlorhexidine 2% Cloths 1 Application(s) Topical <User Schedule>  clopidogrel Tablet 75 milliGRAM(s) Oral daily  collagenase Ointment 1 Application(s) Topical daily  diVALproex Sprinkle 500 milliGRAM(s) Oral two times a day  heparin   Injectable 5000 Unit(s) SubCutaneous every 12 hours  iron sucrose IVPB 200 milliGRAM(s) IV Intermittent every 24 hours  lactobacillus acidophilus 1 Tablet(s) Oral two times a day  levETIRAcetam  Solution 750 milliGRAM(s) Oral two times a day  levothyroxine 225 MICROGram(s) Oral daily  loratadine 10 milliGRAM(s) Oral daily  magnesium oxide 400 milliGRAM(s) Oral three times a day with meals  metoprolol tartrate 50 milliGRAM(s) Oral every 8 hours  multivitamin 1 Tablet(s) Oral daily  mupirocin 2% Ointment 1 Application(s) Topical two times a day  pantoprazole   Suspension 40 milliGRAM(s) Oral daily  PHENobarbital 64.8 milliGRAM(s) Oral two times a day  sertraline 100 milliGRAM(s) Oral daily  tamsulosin 0.4 milliGRAM(s) Oral at bedtime      =======================================================  Labs:                        10-14    137  |  99  |  8.0  ----------------------------<  120<H>  4.1   |  24.0  |  0.28<L>    Ca    9.2      14 Oct 2020 06:58  Mg     1.9     10-14          Culture - Blood (collected 10-09-20 @ 22:17)  Source: .Blood Blood    Culture - Blood (collected 10-09-20 @ 22:17)  Source: .Blood Blood    Culture - Urine (collected 10-09-20 @ 21:58)  Source: .Urine Clean Catch (Midstream)  Final Report (10-11-20 @ 18:53):    >100,000 CFU/ml Providencia stuartii    >100,000 CFU/ml Proteus mirabilis  Organism: Providencia stuartii  Proteus mirabilis (10-11-20 @ 18:53)  Organism: Proteus mirabilis (10-11-20 @ 18:53)    Sensitivities:      -  Amikacin: S <=16      -  Amoxicillin/Clavulanic Acid: S <=8/4      -  Ampicillin: S <=8 These ampicillin results predict results for amoxicillin      -  Ampicillin/Sulbactam: S <=4/2 Enterobacter, Citrobacter, and Serratia may develop resistance during prolonged therapy (3-4 days)      -  Aztreonam: S <=4      -  Cefazolin: S <=2 (MIC_CL_COM_ENTERIC_CEFAZU) For uncomplicated UTI with K. pneumoniae, E. coli, or P. mirablis: DEBBIE <=16 is sensitive and DEBBIE >=32 is resistant. This also predicts results for oral agents cefaclor, cefdinir, cefpodoxime, cefprozil, cefuroxime axetil, cephalexin and locarbef for uncomplicated UTI. Note that some isolates may be susceptible to these agents while testing resistant to cefazolin.      -  Cefepime: S <=2      -  Cefoxitin: S <=8      -  Ceftriaxone: S <=1 Enterobacter, Citrobacter, and Serratia may develop resistance during prolonged therapy      -  Ciprofloxacin: R 2      -  Ertapenem: S <=0.5      -  Gentamicin: S <=2      -  Levofloxacin: I 1      -  Meropenem: S <=1      -  Nitrofurantoin: R >64 Should not be used to treat pyelonephritis      -  Piperacillin/Tazobactam: S <=8      -  Tobramycin: S 4      -  Trimethoprim/Sulfamethoxazole: S <=0.5/9.5      Method Type: DEBBIE  Organism: Providencia stuartii (10-11-20 @ 18:53)    Sensitivities:      -  Amikacin: S <=16      -  Amoxicillin/Clavulanic Acid: R <=8/4      -  Ampicillin: R <=8 These ampicillin results predict results for amoxicillin      -  Ampicillin/Sulbactam: S <=4/2 Enterobacter, Citrobacter, and Serratia may develop resistance during prolonged therapy (3-4 days)      -  Aztreonam: S <=4      -  Cefazolin: R 4      -  Cefepime: S <=2      -  Cefoxitin: S <=8      -  Ceftriaxone: S <=1 Enterobacter, Citrobacter, and Serratia may develop resistance during prolonged therapy      -  Ciprofloxacin: R >2      -  Ertapenem: S <=0.5      -  Levofloxacin: I 1      -  Meropenem: S <=1      -  Nitrofurantoin: R >64 Should not be used to treat pyelonephritis      -  Piperacillin/Tazobactam: S <=8      -  Tigecycline: R 4      -  Trimethoprim/Sulfamethoxazole: S <=0.5/9.5      Method Type: DEBBIE    Culture - Blood (collected 10-09-20 @ 16:20)  Source: .Blood Blood-Peripheral    Culture - Blood (collected 10-09-20 @ 16:19)  Source: .Blood Blood-Peripheral      Creatinine, Serum: 0.28 mg/dL (10-13-20 @ 08:01)  Creatinine, Serum: 0.32 mg/dL (10-12-20 @ 08:45)  Creatinine, Serum: 0.37 mg/dL (10-11-20 @ 07:12)  Creatinine, Serum: 0.44 mg/dL (10-10-20 @ 03:17)  Creatinine, Serum: 0.52 mg/dL (10-09-20 @ 16:07)    Procalcitonin, Serum: 0.44 ng/mL (10-09-20 @ 16:07)    D-Dimer Assay, Quantitative: 996 ng/mL DDU (10-09-20 @ 16:07)    Ferritin, Serum: 357 ng/mL (10-12-20 @ 08:45)  Ferritin, Serum: 238 ng/mL (10-09-20 @ 16:07)    C-Reactive Protein, Serum: 16.03 mg/dL (10-09-20 @ 16:07)    WBC Count: 5.05 K/uL (10-12-20 @ 08:45)  WBC Count: 6.50 K/uL (10-11-20 @ 07:12)  WBC Count: 23.97 K/uL (10-10-20 @ 03:17)  WBC Count: 32.23 K/uL (10-09-20 @ 16:07)    Rapid RVP Result: NotDetec (10-09-20 @ 17:36)    COVID-19 PCR: NotDetec (10-09-20 @ 15:47)      Alkaline Phosphatase, Serum: 83 U/L (10-09-20 @ 16:07)  Alanine Aminotransferase (ALT/SGPT): 19 U/L (10-09-20 @ 16:07)  Aspartate Aminotransferase (AST/SGOT): 26 U/L (10-09-20 @ 16:07)  Bilirubin Total, Serum: 0.4 mg/dL (10-09-20 @ 16:07)

## 2020-10-15 NOTE — SWALLOW BEDSIDE ASSESSMENT ADULT - SLP GENERAL OBSERVATIONS
Pt received A&A in bed, 0x2, required maximal cues for participation in evaluation, frequently stating, "no!" and "I don't want to eat!" Pt agreeable to trials of water and accepted one bite of chocolate pudding, refusing all additional trials.
Pt received & seen seated upright in bed, awake/alert, NP Marta present, 0/10 pain, encouragement to accept PO, with periods of agitation, reduced cognition

## 2020-10-15 NOTE — PROGRESS NOTE ADULT - ASSESSMENT
Received call from Aleshia with the gastroenterology department at [facility] and she stated that they will not see him as he was dismissed from [facility] due to multiple cancellations. Aleshia states that they are also not accepting new TPN patients.    64 y/o M w/ a PMH of MR, HTN, bipolar disease, seizure disorder was BIBA from [facility] after being found to have a fever x 1 day.  Pt is a poor historian, lethargic and information was obtained from chart.  Facility reported pt had fever of 102.4 and hypoxia to 82%, placed on 2L NC and improved to >90%.   At baseline pt is wheelchair bound, verbal and oriented to person and place.  Of note, pt has a chronic buckley and PEG.      PT WITH TWO ORGANISMS  IN URINE CX AND PLACED ON IV  ABX   PT    WITH RECURRENT FEVER   BUT NOW  AFEBRILE    AND ON  ORAL ABX VANTIN     TO COMPLETE COURSE OF ABX    REPEAT BLOOD  CX X 2  AND CXR DONE    FOR D/C TODAY  WILL FOLLOWUP AS NEEDED

## 2020-10-15 NOTE — PROGRESS NOTE ADULT - SUBJECTIVE AND OBJECTIVE BOX
CC: UTI    INTERVAL HPI/OVERNIGHT EVENTS: Patient seen and examined. Seen by Speech this am, as per facility patient will eat chocolate cake if offered. Patient cooperated with swallow eval, able to take chocolate cake and thin liquids. Requesting to be left alone and unwilling to cooperate further. Appears comfortable.    Vital Signs Last 24 Hrs  T(C): 36.8 (15 Oct 2020 07:47), Max: 37 (14 Oct 2020 15:51)  T(F): 98.2 (15 Oct 2020 07:47), Max: 98.6 (14 Oct 2020 15:51)  HR: 80 (15 Oct 2020 07:47) (64 - 93)  BP: 128/78 (15 Oct 2020 07:47) (102/61 - 128/78)  BP(mean): --  RR: 18 (15 Oct 2020 07:47) (18 - 19)  SpO2: 96% (14 Oct 2020 15:51) (96% - 96%)    ROS:  Limited due to patient lack of cooperation    PE  General: NAD  Chest: CTA bilaterally. No wheezing, rales or rhonchi.   Heart: Normal S1 & S2. RRR.   Abdomen: Soft. Non-tender. Non-distended. + BS. PEG in place.   : Garcia's catheter in place  Ext: contracted  Neuro: Awake   I&O's Detail    14 Oct 2020 07:01  -  15 Oct 2020 07:00  --------------------------------------------------------  IN:    Free Water: 300 mL    IV PiggyBack: 50 mL    Jevity 1.5: 720 mL  Total IN: 1070 mL    OUT:    Indwelling Catheter - Urethral (mL): 1600 mL  Total OUT: 1600 mL    Total NET: -530 mL      15 Oct 2020 07:01  -  15 Oct 2020 11:17  --------------------------------------------------------  IN:    Free Water: 150 mL    Jevity 1.5: 250 mL  Total IN: 400 mL    OUT:  Total OUT: 0 mL    Total NET: 400 mL                14 Oct 2020 06:58    137    |  99     |  8.0    ----------------------------<  120    4.1     |  24.0   |  0.28     Ca    9.2        14 Oct 2020 06:58  Mg     1.9       14 Oct 2020 06:58        CAPILLARY BLOOD GLUCOSE              MEDICATIONS  (STANDING):  ascorbic acid 500 milliGRAM(s) Oral daily  aspirin  chewable 81 milliGRAM(s) Enteral Tube daily  cefpodoxime 200 milliGRAM(s) Oral every 12 hours  chlorhexidine 2% Cloths 1 Application(s) Topical <User Schedule>  clopidogrel Tablet 75 milliGRAM(s) Oral daily  collagenase Ointment 1 Application(s) Topical daily  diVALproex Sprinkle 500 milliGRAM(s) Oral two times a day  ferrous    sulfate Liquid 300 milliGRAM(s) Oral three times a day with meals  heparin   Injectable 5000 Unit(s) SubCutaneous every 12 hours  lactobacillus acidophilus 1 Tablet(s) Oral two times a day  levETIRAcetam  Solution 750 milliGRAM(s) Oral two times a day  levothyroxine 225 MICROGram(s) Oral daily  loratadine 10 milliGRAM(s) Oral daily  metoprolol tartrate 50 milliGRAM(s) Oral every 8 hours  multivitamin 1 Tablet(s) Oral daily  mupirocin 2% Nasal 1 Application(s) Both Nostrils two times a day  mupirocin 2% Ointment 1 Application(s) Topical two times a day  pantoprazole   Suspension 40 milliGRAM(s) Oral daily  PHENobarbital 64.8 milliGRAM(s) Oral two times a day  sertraline 100 milliGRAM(s) Oral daily  tamsulosin 0.4 milliGRAM(s) Oral at bedtime    MEDICATIONS  (PRN):  acetaminophen    Suspension .. 650 milliGRAM(s) Oral every 6 hours PRN Temp greater or equal to 38C (100.4F), Mild Pain (1 - 3), Moderate Pain (4 - 6)      RADIOLOGY & ADDITIONAL TESTS:   CC: UTI    INTERVAL HPI/OVERNIGHT EVENTS: Patient seen and examined. Seen by Speech this am, as per facility patient will eat chocolate cake if offered. Patient cooperated with swallow eval, able to take chocolate cake and thin liquids. Requesting to be left alone and unwilling to cooperate further. Appears comfortable.    Vital Signs   T(C): 36.8 (15 Oct 2020 07:47), Max: 37 (14 Oct 2020 15:51)  T(F): 98.2 (15 Oct 2020 07:47), Max: 98.6 (14 Oct 2020 15:51)  HR: 80 (15 Oct 2020 07:47) (64 - 93)  BP: 128/78 (15 Oct 2020 07:47) (102/61 - 128/78)  RR: 18 (15 Oct 2020 07:47) (18 - 19)  SpO2: 96% (14 Oct 2020 15:51) (96% - 96%)    ROS:  Limited due to patient lack of cooperation    PE  General: NAD  Chest: CTA bilaterally. No wheezing, rales or rhonchi.   Heart: Normal S1 & S2. RRR.   Abdomen: Soft. Non-tender. Non-distended. + BS. PEG in place.   : Garcia's catheter in place  Ext: contracted  Neuro: Awake   I&O's Detail    14 Oct 2020 07:01  -  15 Oct 2020 07:00  --------------------------------------------------------  IN:    Free Water: 300 mL    IV PiggyBack: 50 mL    Jevity 1.5: 720 mL  Total IN: 1070 mL    OUT:    Indwelling Catheter - Urethral (mL): 1600 mL  Total OUT: 1600 mL    Total NET: -530 mL      15 Oct 2020 07:01  -  15 Oct 2020 11:17  --------------------------------------------------------  IN:    Free Water: 150 mL    Jevity 1.5: 250 mL  Total IN: 400 mL    OUT:  Total OUT: 0 mL    Total NET: 400 mL    14 Oct 2020 06:58    137    |  99     |  8.0    ----------------------------<  120    4.1     |  24.0   |  0.28     Ca    9.2        14 Oct 2020 06:58  Mg     1.9       14 Oct 2020 06:58      MEDICATIONS  (STANDING):  ascorbic acid 500 milliGRAM(s) Oral daily  aspirin  chewable 81 milliGRAM(s) Enteral Tube daily  cefpodoxime 200 milliGRAM(s) Oral every 12 hours  chlorhexidine 2% Cloths 1 Application(s) Topical <User Schedule>  clopidogrel Tablet 75 milliGRAM(s) Oral daily  collagenase Ointment 1 Application(s) Topical daily  diVALproex Sprinkle 500 milliGRAM(s) Oral two times a day  ferrous    sulfate Liquid 300 milliGRAM(s) Oral three times a day with meals  heparin   Injectable 5000 Unit(s) SubCutaneous every 12 hours  lactobacillus acidophilus 1 Tablet(s) Oral two times a day  levETIRAcetam  Solution 750 milliGRAM(s) Oral two times a day  levothyroxine 225 MICROGram(s) Oral daily  loratadine 10 milliGRAM(s) Oral daily  metoprolol tartrate 50 milliGRAM(s) Oral every 8 hours  multivitamin 1 Tablet(s) Oral daily  mupirocin 2% Nasal 1 Application(s) Both Nostrils two times a day  mupirocin 2% Ointment 1 Application(s) Topical two times a day  pantoprazole   Suspension 40 milliGRAM(s) Oral daily  PHENobarbital 64.8 milliGRAM(s) Oral two times a day  sertraline 100 milliGRAM(s) Oral daily  tamsulosin 0.4 milliGRAM(s) Oral at bedtime    MEDICATIONS  (PRN):  acetaminophen    Suspension .. 650 milliGRAM(s) Oral every 6 hours PRN Temp greater or equal to 38C (100.4F), Mild Pain (1 - 3), Moderate Pain (4 - 6)      RADIOLOGY & ADDITIONAL TESTS:

## 2020-10-15 NOTE — SWALLOW BEDSIDE ASSESSMENT ADULT - COMMENTS
As per MD note: "64 y/o M w/ a PMH of MR, HTN, bipolar disease, seizure disorder was BIBA from [facility] after being found to have a fever x 1 day.  Pt is a poor historian, lethargic and information was obtained from chart.  Facility reported pt had fever of 102.4 and hypoxia to 82%, placed on 2L NC and improved to >90%.   At baseline pt is wheelchair bound, verbal and oriented to person and place.  Of note, pt has a chronic buckley and PEG. Contacted [facility] and spoke with SHOSHANA Grimm to determine baseline diet information. Sirisha reports pt receiving primarily water bolus and liquid medication via PEG. He has been on a ground/thin liquid diet, however reportedly often refuses meals and is very picky. He reportedly likes "sweet things like chocolate," ground eggs and toast, puddings, and yogurts. The facility has been attempting 6 smaller meals in an effort to improve PO intake and reports at least 3 Ensure+ daily, with a maximum of 6 when he refuses to eat meals."    Pt known to this dept & was seen this admission. Last attempt to be seen was 10/13 with following findings: " Pt declined to accept any PO despite maximal encouragement, food choices, and cues.  Pt became slightly agitated and requested clinician to leave room. +Behavioral overlay further impacting participation."

## 2020-10-17 LAB
CULTURE RESULTS: SIGNIFICANT CHANGE UP
CULTURE RESULTS: SIGNIFICANT CHANGE UP
SPECIMEN SOURCE: SIGNIFICANT CHANGE UP
SPECIMEN SOURCE: SIGNIFICANT CHANGE UP

## 2020-10-20 NOTE — CDI QUERY NOTE - NSCDIOTHERTXTBX_GEN_ALL_CORE_HH
As per documentation patient has chronic buckley catheter, can you please clarify if there a relationship between sepsis and/or UTI to the presence of buckley catheter?    A.	Sepsis due to UTI likely from chronic buckley catheter  B.	Sepsis due to UTI not related to chronic buckley catheter  C.	Other, please specify  D.	Not clinically significant    Supporting Documentations:      Discharge Note Provider:  Hospital Course	  65 years old male with PMH of MR, Seizure Disorder, CVA, A. Flutter s/p WATCHMAN, Hypothyroidism and Indwelling Buckley's Catheter presented from [facility] with fever and hypoxia, blood cultures negative. The patient's  indwelling Buckley's catheter was changed in ER. Urine culture with Providencia and Proteus. The patient was seen in consultation by ID. Received IV antibiotics: Rocephin, changed to oral to complete course.    PRINCIPAL DISCHARGE DIAGNOSIS  Diagnosis: UTI (urinary tract infection)  Assessment and Plan of Treatment: Complete antibiotics as prescribed  Chronic indwelling buckley

## 2020-11-18 RX ORDER — LEVOTHYROXINE SODIUM 0.11 MG/1
112 TABLET ORAL
Refills: 0 | Status: ACTIVE | COMMUNITY
Start: 2017-12-01

## 2020-11-18 RX ORDER — VALPROIC ACID 250 MG/5ML
250 SOLUTION ORAL
Refills: 0 | Status: ACTIVE | COMMUNITY
Start: 2017-12-14

## 2020-11-19 ENCOUNTER — NON-APPOINTMENT (OUTPATIENT)
Age: 65
End: 2020-11-19

## 2020-11-19 ENCOUNTER — APPOINTMENT (OUTPATIENT)
Dept: ELECTROPHYSIOLOGY | Facility: CLINIC | Age: 65
End: 2020-11-19
Payer: MEDICARE

## 2020-11-19 VITALS
HEIGHT: 67 IN | BODY MASS INDEX: 20.09 KG/M2 | TEMPERATURE: 97.4 F | WEIGHT: 128 LBS | OXYGEN SATURATION: 97 % | SYSTOLIC BLOOD PRESSURE: 111 MMHG | HEART RATE: 95 BPM | DIASTOLIC BLOOD PRESSURE: 68 MMHG

## 2020-11-19 PROCEDURE — 93000 ELECTROCARDIOGRAM COMPLETE: CPT

## 2020-11-19 PROCEDURE — 99215 OFFICE O/P EST HI 40 MIN: CPT

## 2020-11-19 RX ORDER — TAMSULOSIN HYDROCHLORIDE 0.4 MG/1
0.4 CAPSULE ORAL TWICE DAILY
Qty: 180 | Refills: 0 | Status: ACTIVE | COMMUNITY
Start: 2020-11-19

## 2020-11-19 RX ORDER — LORATADINE 10 MG/1
10 TABLET ORAL
Refills: 0 | Status: ACTIVE | COMMUNITY
Start: 2020-11-19

## 2020-11-19 RX ORDER — SERTRALINE HYDROCHLORIDE 100 MG/1
100 TABLET, FILM COATED ORAL DAILY
Qty: 90 | Refills: 0 | Status: ACTIVE | COMMUNITY
Start: 2020-11-19

## 2020-11-19 RX ORDER — DILTIAZEM HYDROCHLORIDE 120 MG/1
120 CAPSULE, EXTENDED RELEASE ORAL 3 TIMES DAILY
Refills: 0 | Status: ACTIVE | COMMUNITY
Start: 2020-11-19

## 2020-11-19 RX ORDER — FERROUS SULFATE 220 (44)/5
220 (44 FE) SOLUTION, ORAL ORAL
Refills: 0 | Status: ACTIVE | COMMUNITY
Start: 2020-11-19

## 2020-11-19 RX ORDER — LEVETIRACETAM 750 MG/1
750 TABLET, FILM COATED ORAL
Refills: 0 | Status: ACTIVE | COMMUNITY
Start: 2020-11-19

## 2020-11-19 NOTE — HISTORY OF PRESENT ILLNESS
[FreeTextEntry1] : 65 year old gentleman with history of mental retardation, HTN, right cervical carotid pseudoaneurysm, prior stroke, atrial flutter and PAF on ILR (CHADSVASc score of 3 - HTN, prior stroke), with recurrent falls, unstable gait, and cognitive dysfunction, s/p LAAO with WATCHMAN implant 2/15/19. Following the Watchman he remained on short term Eliquis subsequent HEIDI revealed stable device placement without significant leak.  He subsequently presented with a GIB and was found to have esophageal tear.  \par He has continued to have episodes of AF noted on his ILR. Interrogation of the ILR today reveals one episode of AF in 3/2020 lasting 50 hours, and overall AF burden <3%. There are also brief pauses in AF <4 seconds. \par Mr Alvarenga has had a significant functional decline since his last visit. He has had more contractions and is mostly wheelchair bound. He has now been moved to a nursing facility for more care. \par He reports occasional palpitation, but denies dizziness or syncope. \par \par He had been on ASA only (325mg qd), but he had a seizure in 11/2019, and there was a questionable TIA. Plavix was then resumed. He is now on ASA and Plavix, and denies new bleeding complications. \par A Cardiac CT was planned for followup of the Watchman device, but he has not gotten it. \par

## 2020-11-19 NOTE — PHYSICAL EXAM
[General Appearance - In No Acute Distress] : no acute distress [Normal Conjunctiva] : the conjunctiva exhibited no abnormalities [Normal Oral Mucosa] : normal oral mucosa [Normal Jugular Venous V Waves Present] : normal jugular venous V waves present [] : no respiratory distress [Respiration, Rhythm And Depth] : normal respiratory rhythm and effort [Heart Rate And Rhythm] : heart rate and rhythm were normal [Heart Sounds] : normal S1 and S2 [Bowel Sounds] : normal bowel sounds [Abdomen Soft] : soft [Abdomen Tenderness] : non-tender [FreeTextEntry1] : in wheelchair, peripheral contractions [Nail Clubbing] : no clubbing of the fingernails [Cyanosis, Localized] : no localized cyanosis [Skin Color & Pigmentation] : normal skin color and pigmentation [No Anxiety] : not feeling anxious

## 2020-11-19 NOTE — REVIEW OF SYSTEMS
[Feeling Fatigued] : feeling fatigued [Shortness Of Breath] : no shortness of breath [Dyspnea on exertion] : not dyspnea during exertion [Chest Pain] : no chest pain [Lower Ext Edema] : no extremity edema [Palpitations] : palpitations [Dizziness] : no dizziness [Convulsions] : no convulsions [Confusion] : confusion was observed [Anxiety] : no anxiety [Easy Bleeding] : no tendency for easy bleeding [Easy Bruising] : no tendency for easy bruising [Negative] : Integumentary

## 2020-11-19 NOTE — DISCUSSION/SUMMARY
[FreeTextEntry1] : 65 year old gentleman with history of mental retardation, HTN, right cervical carotid pseudoaneurysm, prior stroke, atrial flutter and PAF on ILR (CHADSVASc score of 3 - HTN, prior stroke), with recurrent falls, unstable gait, and cognitive dysfunction, s/p LAAO with WATCHMAN implant 2/15/19. His Watchman device has been well seated on prior followup up. He has had suspected TIA since his last visit, and is now taking ASA and Plavix, which he is tolerating. He does have low burden of paroxysmal AF, which has not required further therapy. Unfortunately he has had a significant functional decline. \par -would continue ASA and Plavix. Can avoid more aggressive anticoagulation given his Watchman device. At this time will defer further evaluation of this device given risks of HEIDI and overall medical condition. \par -will continue beta blockade for AF as tolerated, no indication at this time for more aggressive rhythm control measures. \par -continue ILR monitoring\par \par

## 2020-11-19 NOTE — REASON FOR VISIT
[Follow-Up - Clinic] : a clinic follow-up of [FreeTextEntry1] : ref Dr Josiah Moreno  ([address] [place] [address] cardiology) [Other: _____] : [unfilled]

## 2022-01-27 NOTE — H&P PST ADULT - BLOOD TRANSFUSION, PREVIOUS, PROFILE
[Brushes teeth, no help] : brushes teeth, no help [Able to tie knot] : able to tie knot [Mature pencil grasp] : mature pencil grasp [Prints some letters and numbers] : prints some letters and numbers [Copies square and triangle] : copies square and triangle [Balances on one foot 5-6 seconds] : balances on one foot 5-6 seconds [Good articulation and language skills] : good articulation and language skills [Counts to 10] : counts to 10 [Names 4+ colors] : names 4+ colors [Follows simple directions] : follows simple directions [Listens and attends] : listens and attends [Knows 2 opposites] : knows 2 opposites no

## 2024-03-19 NOTE — ASU PATIENT PROFILE, ADULT - TOBACCO USE

## 2024-11-21 NOTE — H&P PST ADULT - CARDIOVASCULAR
SURGICAL BOOKING SHEET   Name: Álvaro Moe III  MRN: [id number]   : 6/3/1972    Surgical Date:    [facility]   Surgical Consent:   left wrist mass removal   Diagnosis:      ICD-10-CM    1. Mass of left hand  R22.32          Workers Comp: Yes   Procedure Codes:   Excision of ganglion cyst (55763)   Disposition:   Outpatient   Operative Time:   1 hr   Antibiotics:   Ancef 2g   Anesthesia Type:   Monitored Anesthesia Care (MAC) + Regional - Supraclavicular   Clearance:    None   Equipment:   Wrist Mass Excision: *Please see Dr. Thomas \"WRIST SOFT TISSUE\" Pref Card* [place], Local Pre-Mix (1% lidocaine w/ epi, 0.5% marcaine, 50/50 mix and 10cc total), upper arm 18-inch Tourniquet, 4-0 monocryl PS-2, Dermabond, Steri strips, 4x8s, 3 inch radha, 3 inch ace wrap    Assistant:   Assistant Surgery: No   Follow Up:   Follow up 10-14 days after surgery   Pain Medication:   OTC   Therapy:   None        negative detailed exam